# Patient Record
Sex: FEMALE | Race: BLACK OR AFRICAN AMERICAN | NOT HISPANIC OR LATINO | Employment: FULL TIME | URBAN - METROPOLITAN AREA
[De-identification: names, ages, dates, MRNs, and addresses within clinical notes are randomized per-mention and may not be internally consistent; named-entity substitution may affect disease eponyms.]

---

## 2017-12-02 ENCOUNTER — OFFICE VISIT (OUTPATIENT)
Dept: URGENT CARE | Facility: MEDICAL CENTER | Age: 37
End: 2017-12-02
Payer: MEDICAID

## 2017-12-02 PROCEDURE — 94640 AIRWAY INHALATION TREATMENT: CPT

## 2017-12-02 PROCEDURE — G0382 LEV 3 HOSP TYPE B ED VISIT: HCPCS

## 2017-12-05 NOTE — PROGRESS NOTES
Assessment    1  Asthma ([id number]) ([id number])    Plan  Asthma    · PredniSONE 10 MG Oral Tablet; take 3 tabelst for 3 days, 2 tablets for 3 days, 1tablet for 3 days   · Ventolin  (90 Base) MCG/ACT Inhalation Aerosol Solution; Inhale 2 puffsevery 4-6 hours as needed for wheeze  Wheezing    · Ipratropium-Albuterol 0 5-2 5 (3) MG/3ML Inhalation Solution  follow up with PCP for check and control meds  Chief Complaint    1  Wheezing  Chief Complaint Free Text Note Form: pt seen by PCP in [place] for same given inhaler but has not picked it up, wheezing SOB noted, denies asthma history  Coughing when lying down at night      History of Present Illness  HPI: 26-year-old female complains of cough and wheezing and shortness of breath for 1 week  She states she has had asthma in the past and has an inhaler but does not have it now  She saw her PCP but did not get the script filled  She had used her grandmother's nebulizer which help  She is short of breath when she lays down  No fever or chills  Hospital Based Practices Required Assessment:  Pain Assessment  the patient states they do not have pain  Social History   · Never a smoker    Current Meds   1  No Reported Medications Recorded    Allergies    1  No Known Drug Allergies    Vitals  Signs   Recorded: [id number] 01:25YB   Systolic: 361  Diastolic: 72  Recorded: [id number] 05:57PM   Temperature: 98 9 F, Rectal  Heart Rate: 104  Respiration: 24  Weight: 240 lb   O2 Saturation: 99  Pain Scale: 0    Physical Exam   Constitutional  General appearance: No acute distress, well appearing and well nourished  Eyes  Conjunctiva and lids: No swelling, erythema or discharge  Pupils and irises: Equal, round and reactive to light  Ears, Nose, Mouth, and Throat  External inspection of ears and nose: Normal    Otoscopic examination: Tympanic membranes translucent with normal light reflex  Canals patent without erythema     Nasal mucosa, septum, and turbinates: Normal without edema or erythema  Oropharynx: Normal with no erythema, edema, exudate or lesions  Pulmonary  Respiratory effort: No increased work of breathing or signs of respiratory distress  Auscultation of lungs: Abnormal  -- Diffuse wheezing  Cardiovascular  Auscultation of heart: Normal rate and rhythm, normal S1 and S2, without murmurs  Lymphatic  Palpation of lymph nodes in neck: No lymphadenopathy  Procedure   Treatment #1 included Albuterol 2 5 mg and Ipratropium 0 5 mg  After treatment #1, the patient noted symptomatic improvement  Air exchange was improved  Wheezes were heard diffusely  No rhonchi were appreciated  No rales were appreciated        Signatures   Electronically signed by : Kiko Leija, [facility]; Dec  2 2017  6:17PM EST                       (Author)    Electronically signed by : DENISE Benito ; Dec  4 2017  1:04PM EST                       (Co-author)

## 2018-01-23 VITALS
DIASTOLIC BLOOD PRESSURE: 72 MMHG | TEMPERATURE: 98.9 F | SYSTOLIC BLOOD PRESSURE: 130 MMHG | OXYGEN SATURATION: 99 % | HEART RATE: 104 BPM | WEIGHT: 240 LBS | RESPIRATION RATE: 24 BRPM

## 2018-12-17 ENCOUNTER — EMERGENCY (EMERGENCY)
Facility: HOSPITAL | Age: 38
LOS: 1 days | Discharge: HOME | End: 2018-12-17
Attending: EMERGENCY MEDICINE | Admitting: EMERGENCY MEDICINE
Payer: MEDICAID

## 2018-12-17 VITALS
RESPIRATION RATE: 18 BRPM | HEART RATE: 59 BPM | SYSTOLIC BLOOD PRESSURE: 114 MMHG | TEMPERATURE: 98 F | OXYGEN SATURATION: 100 % | DIASTOLIC BLOOD PRESSURE: 82 MMHG

## 2018-12-17 VITALS
RESPIRATION RATE: 16 BRPM | SYSTOLIC BLOOD PRESSURE: 120 MMHG | HEART RATE: 63 BPM | DIASTOLIC BLOOD PRESSURE: 71 MMHG | OXYGEN SATURATION: 100 % | TEMPERATURE: 98 F

## 2018-12-17 LAB
ALBUMIN SERPL ELPH-MCNC: 3.6 G/DL — SIGNIFICANT CHANGE UP (ref 3.3–5)
ALP SERPL-CCNC: 84 U/L — SIGNIFICANT CHANGE UP (ref 40–120)
ALT FLD-CCNC: 17 U/L — SIGNIFICANT CHANGE UP (ref 4–33)
APPEARANCE UR: CLEAR — SIGNIFICANT CHANGE UP
AST SERPL-CCNC: 19 U/L — SIGNIFICANT CHANGE UP (ref 4–32)
BASOPHILS # BLD AUTO: 0.08 K/UL — SIGNIFICANT CHANGE UP (ref 0–0.2)
BASOPHILS NFR BLD AUTO: 1.7 % — SIGNIFICANT CHANGE UP (ref 0–2)
BILIRUB SERPL-MCNC: 0.2 MG/DL — SIGNIFICANT CHANGE UP (ref 0.2–1.2)
BILIRUB UR-MCNC: NEGATIVE — SIGNIFICANT CHANGE UP
BLOOD UR QL VISUAL: SIGNIFICANT CHANGE UP
BUN SERPL-MCNC: 5 MG/DL — LOW (ref 7–23)
CALCIUM SERPL-MCNC: 8.9 MG/DL — SIGNIFICANT CHANGE UP (ref 8.4–10.5)
CHLORIDE SERPL-SCNC: 107 MMOL/L — SIGNIFICANT CHANGE UP (ref 98–107)
CO2 SERPL-SCNC: 23 MMOL/L — SIGNIFICANT CHANGE UP (ref 22–31)
COLOR SPEC: COLORLESS — SIGNIFICANT CHANGE UP
CREAT SERPL-MCNC: 0.83 MG/DL — SIGNIFICANT CHANGE UP (ref 0.5–1.3)
EOSINOPHIL # BLD AUTO: 0.31 K/UL — SIGNIFICANT CHANGE UP (ref 0–0.5)
EOSINOPHIL NFR BLD AUTO: 6.5 % — HIGH (ref 0–6)
GLUCOSE SERPL-MCNC: 94 MG/DL — SIGNIFICANT CHANGE UP (ref 70–99)
GLUCOSE UR-MCNC: NEGATIVE — SIGNIFICANT CHANGE UP
HCG SERPL-ACNC: < 5 MIU/ML — SIGNIFICANT CHANGE UP
HCT VFR BLD CALC: 36.2 % — SIGNIFICANT CHANGE UP (ref 34.5–45)
HGB BLD-MCNC: 11.7 G/DL — SIGNIFICANT CHANGE UP (ref 11.5–15.5)
HIV COMBO RESULT: SIGNIFICANT CHANGE UP
HIV1+2 AB SPEC QL: SIGNIFICANT CHANGE UP
IMM GRANULOCYTES # BLD AUTO: 0.01 # — SIGNIFICANT CHANGE UP
IMM GRANULOCYTES NFR BLD AUTO: 0.2 % — SIGNIFICANT CHANGE UP (ref 0–1.5)
KETONES UR-MCNC: NEGATIVE — SIGNIFICANT CHANGE UP
LEUKOCYTE ESTERASE UR-ACNC: NEGATIVE — SIGNIFICANT CHANGE UP
LYMPHOCYTES # BLD AUTO: 2.69 K/UL — SIGNIFICANT CHANGE UP (ref 1–3.3)
LYMPHOCYTES # BLD AUTO: 56.3 % — HIGH (ref 13–44)
MCHC RBC-ENTMCNC: 27.6 PG — SIGNIFICANT CHANGE UP (ref 27–34)
MCHC RBC-ENTMCNC: 32.3 % — SIGNIFICANT CHANGE UP (ref 32–36)
MCV RBC AUTO: 85.4 FL — SIGNIFICANT CHANGE UP (ref 80–100)
MONOCYTES # BLD AUTO: 0.45 K/UL — SIGNIFICANT CHANGE UP (ref 0–0.9)
MONOCYTES NFR BLD AUTO: 9.4 % — SIGNIFICANT CHANGE UP (ref 2–14)
NEUTROPHILS # BLD AUTO: 1.24 K/UL — LOW (ref 1.8–7.4)
NEUTROPHILS NFR BLD AUTO: 25.9 % — LOW (ref 43–77)
NITRITE UR-MCNC: NEGATIVE — SIGNIFICANT CHANGE UP
NRBC # FLD: 0 — SIGNIFICANT CHANGE UP
PH UR: 6.5 — SIGNIFICANT CHANGE UP (ref 5–8)
PLATELET # BLD AUTO: 259 K/UL — SIGNIFICANT CHANGE UP (ref 150–400)
PMV BLD: 10.3 FL — SIGNIFICANT CHANGE UP (ref 7–13)
POTASSIUM SERPL-MCNC: 4.5 MMOL/L — SIGNIFICANT CHANGE UP (ref 3.5–5.3)
POTASSIUM SERPL-SCNC: 4.5 MMOL/L — SIGNIFICANT CHANGE UP (ref 3.5–5.3)
PROT SERPL-MCNC: 7.4 G/DL — SIGNIFICANT CHANGE UP (ref 6–8.3)
PROT UR-MCNC: NEGATIVE — SIGNIFICANT CHANGE UP
RBC # BLD: 4.24 M/UL — SIGNIFICANT CHANGE UP (ref 3.8–5.2)
RBC # FLD: 15 % — HIGH (ref 10.3–14.5)
SODIUM SERPL-SCNC: 139 MMOL/L — SIGNIFICANT CHANGE UP (ref 135–145)
SP GR SPEC: 1.01 — SIGNIFICANT CHANGE UP (ref 1–1.04)
UROBILINOGEN FLD QL: NORMAL — SIGNIFICANT CHANGE UP
WBC # BLD: 4.78 K/UL — SIGNIFICANT CHANGE UP (ref 3.8–10.5)
WBC # FLD AUTO: 4.78 K/UL — SIGNIFICANT CHANGE UP (ref 3.8–10.5)

## 2018-12-17 PROCEDURE — 74176 CT ABD & PELVIS W/O CONTRAST: CPT | Mod: 26

## 2018-12-17 PROCEDURE — 99285 EMERGENCY DEPT VISIT HI MDM: CPT

## 2018-12-17 RX ORDER — MORPHINE SULFATE 50 MG/1
4 CAPSULE, EXTENDED RELEASE ORAL ONCE
Qty: 0 | Refills: 0 | Status: DISCONTINUED | OUTPATIENT
Start: 2018-12-17 | End: 2018-12-17

## 2018-12-17 RX ORDER — SODIUM CHLORIDE 9 MG/ML
1000 INJECTION INTRAMUSCULAR; INTRAVENOUS; SUBCUTANEOUS ONCE
Qty: 0 | Refills: 0 | Status: COMPLETED | OUTPATIENT
Start: 2018-12-17 | End: 2018-12-17

## 2018-12-17 RX ORDER — DIAZEPAM 5 MG
5 TABLET ORAL ONCE
Qty: 0 | Refills: 0 | Status: DISCONTINUED | OUTPATIENT
Start: 2018-12-17 | End: 2018-12-17

## 2018-12-17 RX ORDER — IBUPROFEN 200 MG
1 TABLET ORAL
Qty: 28 | Refills: 0 | OUTPATIENT
Start: 2018-12-17 | End: 2018-12-23

## 2018-12-17 RX ORDER — DIAZEPAM 5 MG
1 TABLET ORAL
Qty: 15 | Refills: 0 | OUTPATIENT
Start: 2018-12-17 | End: 2018-12-21

## 2018-12-17 RX ORDER — KETOROLAC TROMETHAMINE 30 MG/ML
30 SYRINGE (ML) INJECTION ONCE
Qty: 0 | Refills: 0 | Status: DISCONTINUED | OUTPATIENT
Start: 2018-12-17 | End: 2018-12-17

## 2018-12-17 RX ORDER — LIDOCAINE 4 G/100G
1 CREAM TOPICAL ONCE
Qty: 0 | Refills: 0 | Status: COMPLETED | OUTPATIENT
Start: 2018-12-17 | End: 2018-12-17

## 2018-12-17 RX ORDER — ONDANSETRON 8 MG/1
4 TABLET, FILM COATED ORAL ONCE
Qty: 0 | Refills: 0 | Status: COMPLETED | OUTPATIENT
Start: 2018-12-17 | End: 2018-12-17

## 2018-12-17 RX ADMIN — MORPHINE SULFATE 4 MILLIGRAM(S): 50 CAPSULE, EXTENDED RELEASE ORAL at 16:52

## 2018-12-17 RX ADMIN — Medication 5 MILLIGRAM(S): at 16:35

## 2018-12-17 RX ADMIN — MORPHINE SULFATE 4 MILLIGRAM(S): 50 CAPSULE, EXTENDED RELEASE ORAL at 12:53

## 2018-12-17 RX ADMIN — SODIUM CHLORIDE 2000 MILLILITER(S): 9 INJECTION INTRAMUSCULAR; INTRAVENOUS; SUBCUTANEOUS at 12:53

## 2018-12-17 RX ADMIN — ONDANSETRON 4 MILLIGRAM(S): 8 TABLET, FILM COATED ORAL at 12:53

## 2018-12-17 RX ADMIN — Medication 30 MILLIGRAM(S): at 16:36

## 2018-12-17 RX ADMIN — LIDOCAINE 1 PATCH: 4 CREAM TOPICAL at 16:41

## 2018-12-17 NOTE — ED ADULT TRIAGE NOTE - CHIEF COMPLAINT QUOTE
Pt. c/o intermittent, non-radiating lower back pain worse on right side x few weeks. States pain became worse last night with nausea and tingling in legs. Took Tylenol with no relief. Denies abdominal pain, n/v/d, dysuria or fevers. Pain worse when ambulating. Denies any injury.

## 2018-12-17 NOTE — ED ADULT NURSE REASSESSMENT NOTE - SYMPTOMS
continues to c.o. rt sided lower back pain currently 7/10, endorses some tingling in rt leg "sometimes" states she has been having this pain for a long time but recently it has gotten worse, denies recent injury, medicated as ordered.

## 2018-12-17 NOTE — ED PROVIDER NOTE - NS_ ATTENDINGSCRIBEDETAILS _ED_A_ED_FT
The scribe's documentation has been prepared under my direction and personally reviewed by me, Minerva Bob MD, in its entirety. I confirm that the note above accurately reflects all work, treatment, procedures, and medical decision making performed by me.

## 2018-12-17 NOTE — ED PROVIDER NOTE - OBJECTIVE STATEMENT
38y F with no significant PMHx presents with intermittent low back pain right side greater than left for several months. Pt states pain became acutely worse last night. Pt states pain is 9/10 in severity and feels pain internally. Specifically pain is not reproducible with touch. Pain is worse with movement or walking and radiates to abd. No radiation down leg. No weakness. No other complaints. Pt does endorse urinary urgency and nausea. No hematuria, dysuria, fever, or chills

## 2018-12-17 NOTE — ED PROVIDER NOTE - CONSTITUTIONAL, MLM
normal... Well appearing, well nourished, awake, alert, oriented to person, place, time/situation. +significant distress secondary to low back pain associated with movement during exam sitting up in bed Well appearing, well nourished, awake, alert, oriented to person, place, time/situation. +distress secondary to low back pain associated with movement during exam sitting up in bed

## 2018-12-17 NOTE — ED PROVIDER NOTE - PROGRESS NOTE DETAILS
Ct negative, but pain not well controlled so Valium and Lidociane and   Toradol given. Pt feels better at 530pm, ambulating with no steady gait but slow due to some pain, able to sit and get up from chair, eager to go home. DC with meds and rest, copies given.

## 2018-12-18 LAB — SPECIMEN SOURCE: SIGNIFICANT CHANGE UP

## 2018-12-19 LAB — BACTERIA UR CULT: SIGNIFICANT CHANGE UP

## 2019-06-18 ENCOUNTER — EMERGENCY (EMERGENCY)
Facility: HOSPITAL | Age: 39
LOS: 1 days | Discharge: HOME | End: 2019-06-18
Admitting: EMERGENCY MEDICINE
Payer: MEDICAID

## 2019-06-18 VITALS
SYSTOLIC BLOOD PRESSURE: 114 MMHG | HEART RATE: 60 BPM | DIASTOLIC BLOOD PRESSURE: 73 MMHG | TEMPERATURE: 98 F | OXYGEN SATURATION: 100 % | RESPIRATION RATE: 17 BRPM

## 2019-06-18 VITALS
SYSTOLIC BLOOD PRESSURE: 107 MMHG | RESPIRATION RATE: 18 BRPM | OXYGEN SATURATION: 100 % | HEART RATE: 60 BPM | DIASTOLIC BLOOD PRESSURE: 73 MMHG | TEMPERATURE: 99 F

## 2019-06-18 LAB
ALBUMIN SERPL ELPH-MCNC: 3.7 G/DL — SIGNIFICANT CHANGE UP (ref 3.3–5)
ALP SERPL-CCNC: 82 U/L — SIGNIFICANT CHANGE UP (ref 40–120)
ALT FLD-CCNC: 25 U/L — SIGNIFICANT CHANGE UP (ref 4–33)
ANION GAP SERPL CALC-SCNC: 10 MMO/L — SIGNIFICANT CHANGE UP (ref 7–14)
APPEARANCE UR: CLEAR — SIGNIFICANT CHANGE UP
AST SERPL-CCNC: 23 U/L — SIGNIFICANT CHANGE UP (ref 4–32)
BASOPHILS # BLD AUTO: 0.06 K/UL — SIGNIFICANT CHANGE UP (ref 0–0.2)
BASOPHILS NFR BLD AUTO: 0.9 % — SIGNIFICANT CHANGE UP (ref 0–2)
BILIRUB SERPL-MCNC: 0.2 MG/DL — SIGNIFICANT CHANGE UP (ref 0.2–1.2)
BILIRUB UR-MCNC: NEGATIVE — SIGNIFICANT CHANGE UP
BLOOD UR QL VISUAL: NEGATIVE — SIGNIFICANT CHANGE UP
BUN SERPL-MCNC: 10 MG/DL — SIGNIFICANT CHANGE UP (ref 7–23)
CALCIUM SERPL-MCNC: 9.1 MG/DL — SIGNIFICANT CHANGE UP (ref 8.4–10.5)
CHLORIDE SERPL-SCNC: 104 MMOL/L — SIGNIFICANT CHANGE UP (ref 98–107)
CO2 SERPL-SCNC: 25 MMOL/L — SIGNIFICANT CHANGE UP (ref 22–31)
COLOR SPEC: SIGNIFICANT CHANGE UP
CREAT SERPL-MCNC: 0.84 MG/DL — SIGNIFICANT CHANGE UP (ref 0.5–1.3)
EOSINOPHIL # BLD AUTO: 0.2 K/UL — SIGNIFICANT CHANGE UP (ref 0–0.5)
EOSINOPHIL NFR BLD AUTO: 2.9 % — SIGNIFICANT CHANGE UP (ref 0–6)
GLUCOSE SERPL-MCNC: 82 MG/DL — SIGNIFICANT CHANGE UP (ref 70–99)
GLUCOSE UR-MCNC: NEGATIVE — SIGNIFICANT CHANGE UP
HCG SERPL-ACNC: < 5 MIU/ML — SIGNIFICANT CHANGE UP
HCT VFR BLD CALC: 37.2 % — SIGNIFICANT CHANGE UP (ref 34.5–45)
HGB BLD-MCNC: 11.7 G/DL — SIGNIFICANT CHANGE UP (ref 11.5–15.5)
IMM GRANULOCYTES NFR BLD AUTO: 0.1 % — SIGNIFICANT CHANGE UP (ref 0–1.5)
KETONES UR-MCNC: NEGATIVE — SIGNIFICANT CHANGE UP
LEUKOCYTE ESTERASE UR-ACNC: NEGATIVE — SIGNIFICANT CHANGE UP
LYMPHOCYTES # BLD AUTO: 3.16 K/UL — SIGNIFICANT CHANGE UP (ref 1–3.3)
LYMPHOCYTES # BLD AUTO: 46.6 % — HIGH (ref 13–44)
MCHC RBC-ENTMCNC: 27.2 PG — SIGNIFICANT CHANGE UP (ref 27–34)
MCHC RBC-ENTMCNC: 31.5 % — LOW (ref 32–36)
MCV RBC AUTO: 86.5 FL — SIGNIFICANT CHANGE UP (ref 80–100)
MONOCYTES # BLD AUTO: 0.54 K/UL — SIGNIFICANT CHANGE UP (ref 0–0.9)
MONOCYTES NFR BLD AUTO: 8 % — SIGNIFICANT CHANGE UP (ref 2–14)
NEUTROPHILS # BLD AUTO: 2.81 K/UL — SIGNIFICANT CHANGE UP (ref 1.8–7.4)
NEUTROPHILS NFR BLD AUTO: 41.5 % — LOW (ref 43–77)
NITRITE UR-MCNC: NEGATIVE — SIGNIFICANT CHANGE UP
NRBC # FLD: 0 K/UL — SIGNIFICANT CHANGE UP (ref 0–0)
PH UR: 6.5 — SIGNIFICANT CHANGE UP (ref 5–8)
PLATELET # BLD AUTO: 279 K/UL — SIGNIFICANT CHANGE UP (ref 150–400)
PMV BLD: 11 FL — SIGNIFICANT CHANGE UP (ref 7–13)
POTASSIUM SERPL-MCNC: 4.2 MMOL/L — SIGNIFICANT CHANGE UP (ref 3.5–5.3)
POTASSIUM SERPL-SCNC: 4.2 MMOL/L — SIGNIFICANT CHANGE UP (ref 3.5–5.3)
PROT SERPL-MCNC: 7.4 G/DL — SIGNIFICANT CHANGE UP (ref 6–8.3)
PROT UR-MCNC: 10 — SIGNIFICANT CHANGE UP
RBC # BLD: 4.3 M/UL — SIGNIFICANT CHANGE UP (ref 3.8–5.2)
RBC # FLD: 14.6 % — HIGH (ref 10.3–14.5)
SODIUM SERPL-SCNC: 139 MMOL/L — SIGNIFICANT CHANGE UP (ref 135–145)
SP GR SPEC: 1.02 — SIGNIFICANT CHANGE UP (ref 1–1.04)
UROBILINOGEN FLD QL: NORMAL — SIGNIFICANT CHANGE UP
WBC # BLD: 6.78 K/UL — SIGNIFICANT CHANGE UP (ref 3.8–10.5)
WBC # FLD AUTO: 6.78 K/UL — SIGNIFICANT CHANGE UP (ref 3.8–10.5)

## 2019-06-18 PROCEDURE — 99285 EMERGENCY DEPT VISIT HI MDM: CPT

## 2019-06-18 PROCEDURE — 74177 CT ABD & PELVIS W/CONTRAST: CPT | Mod: 26

## 2019-06-18 PROCEDURE — 76830 TRANSVAGINAL US NON-OB: CPT | Mod: 26

## 2019-06-18 RX ORDER — SODIUM CHLORIDE 9 MG/ML
1000 INJECTION INTRAMUSCULAR; INTRAVENOUS; SUBCUTANEOUS ONCE
Refills: 0 | Status: COMPLETED | OUTPATIENT
Start: 2019-06-18 | End: 2019-06-18

## 2019-06-18 RX ORDER — MORPHINE SULFATE 50 MG/1
4 CAPSULE, EXTENDED RELEASE ORAL ONCE
Refills: 0 | Status: DISCONTINUED | OUTPATIENT
Start: 2019-06-18 | End: 2019-06-18

## 2019-06-18 RX ADMIN — SODIUM CHLORIDE 1000 MILLILITER(S): 9 INJECTION INTRAMUSCULAR; INTRAVENOUS; SUBCUTANEOUS at 15:08

## 2019-06-18 RX ADMIN — MORPHINE SULFATE 4 MILLIGRAM(S): 50 CAPSULE, EXTENDED RELEASE ORAL at 15:29

## 2019-06-18 NOTE — ED PROVIDER NOTE - CLINICAL SUMMARY MEDICAL DECISION MAKING FREE TEXT BOX
37 yo F with PMHX of diverticulitis, here with LLQ pain, pelvic pain and vag bleeding  Minimal blood on exam. NO CMT.   Plan for r/o diverticulitis vs uti vs ovarian pathology.   UCG, ua, labs, ct a/p, TVUS, pain control, reassess.

## 2019-06-18 NOTE — ED PROVIDER NOTE - PROGRESS NOTE DETAILS
labs wnl, ua no evidence of infection. UCG neg CT with diverticulosis, no diverticulitis. TVUS with no evidence of torsion or cyst. Pt feels improved. DC home with PCP fu labs wnl, ua no evidence of infection. UCG neg CT with diverticulosis, no diverticulitis. TVUS with no evidence of torsion or cyst. Pt feels improved, no pain, tolerating PO DC home with PCP fu

## 2019-06-18 NOTE — ED PROVIDER NOTE - OBJECTIVE STATEMENT
37 yo F with PMHx of diverticulitis presents to the ED c/o AUB and pelvic left sided discomfort x this am. Pt reports that she has experienced pain like this before, feels slightly different from when she was diagnosed with diverticulitis because it is lower in the pelvic area. Reports light pink discharge and vaginal pruritis. Denies sexual activity at this time. LMP was 7th to the 15th of this month (june). Denies nausea, vomiting, fevers, diarrhea, constipation.   LBM this am and normal. 39 yo F with PMHx of diverticulitis presents to the ED c/o AUB and pelvic left sided discomfort x this am. Pt reports that she has experienced pain like this before, feels slightly different from when she was diagnosed with diverticulitis because it is lower in the pelvic area. Reports light pink discharge and vaginal pruritis. Denies sexual activity at this time. LMP was 7th to the 15th of this month (june). Denies nausea, vomiting, fevers, diarrhea, constipation.   LBM this am and normal, non bloody, non dark. 37 yo F with PMHx of diverticulitis presents to the ED c/o AUB and pelvic left sided discomfort x this am. Pt reports that she has experienced pain like this before, feels slightly different from when she was diagnosed with diverticulitis because it is lower in the pelvic area. Reports light pink discharge and vaginal pruritis. Denies sexual activity at this time, has not been sexual active x years no h/o STDs.  LMP was 7th to the 15th of this month (june). Denies nausea, vomiting, fevers, diarrhea, constipation.   LBM this am and normal, non bloody, non dark.

## 2019-06-18 NOTE — ED ADULT NURSE NOTE - OBJECTIVE STATEMENT
break coverage RN- pt with PMH diverticulitis c/o LLQ pain and pink tinged vaginal discharge since yesterday. pt currently awake, a/ox3, vitally stable in NAD at this time, IV 20g placed to left AC, labs sent, fluids running as ordered, MD at bedside, awaiting further orders from MD, will continue to monitor, pt safety maintained.

## 2019-06-18 NOTE — ED PROVIDER NOTE - NSFOLLOWUPINSTRUCTIONS_ED_ALL_ED_FT
Follow up with your Primary Medical Doctor within 2-3days. If results or reports were given to you, show copies of your reports given to you. Take all of your medications as previously prescribed.   If you have issues obtaining follow up, please call: [phone] to obtain a doctor or specialist who takes your insurance in your area.    If Follow up with your Primary Medical Doctor within 2-3days. If results or reports were given to you, show copies of your reports given to you. Take all of your medications as previously prescribed.   If you have issues obtaining follow up, please call: [phone] to obtain a doctor or specialist who takes your insurance in your area.    If worsening pain, fevers, vomiting,

## 2019-06-20 LAB
BACTERIA UR CULT: SIGNIFICANT CHANGE UP
SPECIMEN SOURCE: SIGNIFICANT CHANGE UP

## 2020-12-23 ENCOUNTER — EMERGENCY (EMERGENCY)
Facility: HOSPITAL | Age: 40
LOS: 1 days | Discharge: HOME | End: 2020-12-23
Attending: EMERGENCY MEDICINE | Admitting: EMERGENCY MEDICINE
Payer: MEDICAID

## 2020-12-23 VITALS
TEMPERATURE: 98 F | OXYGEN SATURATION: 100 % | HEART RATE: 91 BPM | RESPIRATION RATE: 18 BRPM | SYSTOLIC BLOOD PRESSURE: 112 MMHG | DIASTOLIC BLOOD PRESSURE: 68 MMHG

## 2020-12-23 PROCEDURE — 99284 EMERGENCY DEPT VISIT MOD MDM: CPT

## 2020-12-23 RX ORDER — SODIUM CHLORIDE 9 MG/ML
1000 INJECTION INTRAMUSCULAR; INTRAVENOUS; SUBCUTANEOUS ONCE
Refills: 0 | Status: COMPLETED | OUTPATIENT
Start: 2020-12-23 | End: 2020-12-23

## 2020-12-23 NOTE — ED PROVIDER NOTE - PATIENT PORTAL LINK FT
You can access the FollowMyHealth Patient Portal offered by [facility] by registering at the following website: [email or web address]. By joining [employer]’s FollowMyHealth portal, you will also be able to view your health information using other applications (apps) compatible with our system.

## 2020-12-23 NOTE — ED PROVIDER NOTE - OBJECTIVE STATEMENT
39 y/o female with PMHx of asthma, GERD and gastric bypass surgery presents to ED c/o chills, myalgias, headache and nausea x 5 days. Pt states she vomited yesterday and today. Denies abdominal pain, chest pain, SOB, diarrhea, urinary symptoms, sick contacts or traveling. States that her son started to have a cough today. When asked, pt reported losing her sense of smell 5 days ago as well. No prior history of COVID19 infection.

## 2020-12-23 NOTE — ED PROVIDER NOTE - NS_ ATTENDINGSCRIBEDETAILS _ED_A_ED_FT
I,Samantha Bonilla, performed the initial face to face bedside interview with this patient regarding history of present illness, review of symptoms and relevant past medical, social and family history.  I completed an independent physical examination.  I was the initial provider who evaluated this patient. The history, relevant review of systems, past medical and surgical history, medical decision making, and physical examination was documented by the scribe in my presence and I attest to the accuracy of the documentation.

## 2020-12-23 NOTE — ED PROVIDER NOTE - CLINICAL SUMMARY MEDICAL DECISION MAKING FREE TEXT BOX
39 y/o female presenting with multiple complaints. Constellation of symptoms concerning for COVID19. Given history of vomiting, will check labs, get x-ray, send COVID19 swab and hydrate. 41 y/o female presenting with multiple complaints. Constellation of symptoms concerning for COVID19. Exam with no focal findings. Specifically, abdominal exam is benign. Given history of vomiting, will check labs to check for dehydration/ DONNY/ electrolyte abnormality, get x-ray, send COVID19 swab and hydrate.

## 2020-12-23 NOTE — ED PROVIDER NOTE - PROGRESS NOTE DETAILS
[place]: received pt on sign out. Pt well appearing. O2 sat 100% on RA. COVID +. CXR clear. Dimer elevated likely 2/2 COVID. Pt denies SOB. Will start on baby asa. Given strict return precautions. Pt verbalizes agreement and understanding. VSS.

## 2020-12-23 NOTE — ED ADULT TRIAGE NOTE - CHIEF COMPLAINT QUOTE
c/o chills, headache, eye pain, body pain, nausea, and whole body weakness since Saturday. vomited twice today. denies any sick contacts. denies chest pain, sob. hx asthma.

## 2020-12-23 NOTE — ED PROVIDER NOTE - NSFOLLOWUPINSTRUCTIONS_ED_ALL_ED_FT
You presented to the emergency department with fever or flu-like symptoms, you were found to have the COVID-19 infection. You were deemed healthy enough to go home at this time and do not require hospitalization. When you go home, please practice safe and sound hygiene by washing your hands frequently, covering your nose/mouth when coughing/sneezing and self quarantining yourself at home until your symptoms improve or go away. If possible, stay away from young children and the elderly as much as possible. Return immediately to the hospital if your symptoms worsen such as you are unable to breath, you start having fevers that are not improved with taking over the counter medications such as tylenol every 4 hours, you cannot eat or drink without throwing up, or you pass out. Otherwise, please follow up with your doctor in the next 1-2 days or when you get better so that they can make sure you are improving and that you do not need more work up for your condition.

## 2020-12-24 VITALS
OXYGEN SATURATION: 100 % | DIASTOLIC BLOOD PRESSURE: 78 MMHG | RESPIRATION RATE: 18 BRPM | HEART RATE: 91 BPM | SYSTOLIC BLOOD PRESSURE: 137 MMHG | TEMPERATURE: 98 F

## 2020-12-24 LAB
ALBUMIN SERPL ELPH-MCNC: 3.8 G/DL — SIGNIFICANT CHANGE UP (ref 3.3–5)
ALP SERPL-CCNC: 102 U/L — SIGNIFICANT CHANGE UP (ref 40–120)
ALT FLD-CCNC: 24 U/L — SIGNIFICANT CHANGE UP (ref 4–33)
ANION GAP SERPL CALC-SCNC: 13 MMOL/L — SIGNIFICANT CHANGE UP (ref 7–14)
APPEARANCE UR: CLEAR — SIGNIFICANT CHANGE UP
AST SERPL-CCNC: 35 U/L — HIGH (ref 4–32)
B PERT DNA SPEC QL NAA+PROBE: SIGNIFICANT CHANGE UP
BACTERIA # UR AUTO: NEGATIVE — SIGNIFICANT CHANGE UP
BASOPHILS # BLD AUTO: 0.04 K/UL — SIGNIFICANT CHANGE UP (ref 0–0.2)
BASOPHILS NFR BLD AUTO: 1.1 % — SIGNIFICANT CHANGE UP (ref 0–2)
BILIRUB SERPL-MCNC: <0.2 MG/DL — SIGNIFICANT CHANGE UP (ref 0.2–1.2)
BILIRUB UR-MCNC: NEGATIVE — SIGNIFICANT CHANGE UP
BUN SERPL-MCNC: 8 MG/DL — SIGNIFICANT CHANGE UP (ref 7–23)
C PNEUM DNA SPEC QL NAA+PROBE: SIGNIFICANT CHANGE UP
CALCIUM SERPL-MCNC: 9.1 MG/DL — SIGNIFICANT CHANGE UP (ref 8.4–10.5)
CHLORIDE SERPL-SCNC: 104 MMOL/L — SIGNIFICANT CHANGE UP (ref 98–107)
CO2 SERPL-SCNC: 22 MMOL/L — SIGNIFICANT CHANGE UP (ref 22–31)
COLOR SPEC: YELLOW — SIGNIFICANT CHANGE UP
CREAT SERPL-MCNC: 0.85 MG/DL — SIGNIFICANT CHANGE UP (ref 0.5–1.3)
CRP SERPL-MCNC: 7.2 MG/L — HIGH
D DIMER BLD IA.RAPID-MCNC: 847 NG/ML DDU — HIGH
DIFF PNL FLD: NEGATIVE — SIGNIFICANT CHANGE UP
EOSINOPHIL # BLD AUTO: 0.07 K/UL — SIGNIFICANT CHANGE UP (ref 0–0.5)
EOSINOPHIL NFR BLD AUTO: 1.9 % — SIGNIFICANT CHANGE UP (ref 0–6)
EPI CELLS # UR: SIGNIFICANT CHANGE UP /HPF (ref 0–5)
FERRITIN SERPL-MCNC: 503 NG/ML — HIGH (ref 15–150)
FLUAV H1 2009 PAND RNA SPEC QL NAA+PROBE: SIGNIFICANT CHANGE UP
FLUAV H1 RNA SPEC QL NAA+PROBE: SIGNIFICANT CHANGE UP
FLUAV H3 RNA SPEC QL NAA+PROBE: SIGNIFICANT CHANGE UP
FLUAV SUBTYP SPEC NAA+PROBE: SIGNIFICANT CHANGE UP
FLUBV RNA SPEC QL NAA+PROBE: SIGNIFICANT CHANGE UP
GLUCOSE SERPL-MCNC: 84 MG/DL — SIGNIFICANT CHANGE UP (ref 70–99)
GLUCOSE UR QL: NEGATIVE — SIGNIFICANT CHANGE UP
HADV DNA SPEC QL NAA+PROBE: SIGNIFICANT CHANGE UP
HCOV PNL SPEC NAA+PROBE: SIGNIFICANT CHANGE UP
HCT VFR BLD CALC: 36.7 % — SIGNIFICANT CHANGE UP (ref 34.5–45)
HGB BLD-MCNC: 12.4 G/DL — SIGNIFICANT CHANGE UP (ref 11.5–15.5)
HMPV RNA SPEC QL NAA+PROBE: SIGNIFICANT CHANGE UP
HPIV1 RNA SPEC QL NAA+PROBE: SIGNIFICANT CHANGE UP
HPIV2 RNA SPEC QL NAA+PROBE: SIGNIFICANT CHANGE UP
HPIV3 RNA SPEC QL NAA+PROBE: SIGNIFICANT CHANGE UP
HPIV4 RNA SPEC QL NAA+PROBE: SIGNIFICANT CHANGE UP
HYALINE CASTS # UR AUTO: SIGNIFICANT CHANGE UP /LPF (ref 0–7)
IANC: 1.38 K/UL — LOW (ref 1.5–8.5)
IMM GRANULOCYTES NFR BLD AUTO: 0.3 % — SIGNIFICANT CHANGE UP (ref 0–1.5)
KETONES UR-MCNC: ABNORMAL
LEUKOCYTE ESTERASE UR-ACNC: NEGATIVE — SIGNIFICANT CHANGE UP
LYMPHOCYTES # BLD AUTO: 1.87 K/UL — SIGNIFICANT CHANGE UP (ref 1–3.3)
LYMPHOCYTES # BLD AUTO: 50.7 % — HIGH (ref 13–44)
MCHC RBC-ENTMCNC: 28.8 PG — SIGNIFICANT CHANGE UP (ref 27–34)
MCHC RBC-ENTMCNC: 33.8 GM/DL — SIGNIFICANT CHANGE UP (ref 32–36)
MCV RBC AUTO: 85.2 FL — SIGNIFICANT CHANGE UP (ref 80–100)
MONOCYTES # BLD AUTO: 0.32 K/UL — SIGNIFICANT CHANGE UP (ref 0–0.9)
MONOCYTES NFR BLD AUTO: 8.7 % — SIGNIFICANT CHANGE UP (ref 2–14)
NEUTROPHILS # BLD AUTO: 1.38 K/UL — LOW (ref 1.8–7.4)
NEUTROPHILS NFR BLD AUTO: 37.3 % — LOW (ref 43–77)
NITRITE UR-MCNC: NEGATIVE — SIGNIFICANT CHANGE UP
NRBC # BLD: 0 /100 WBCS — SIGNIFICANT CHANGE UP
NRBC # FLD: 0 K/UL — SIGNIFICANT CHANGE UP
PH UR: 6 — SIGNIFICANT CHANGE UP (ref 5–8)
PLATELET # BLD AUTO: 230 K/UL — SIGNIFICANT CHANGE UP (ref 150–400)
POTASSIUM SERPL-MCNC: 4.1 MMOL/L — SIGNIFICANT CHANGE UP (ref 3.5–5.3)
POTASSIUM SERPL-SCNC: 4.1 MMOL/L — SIGNIFICANT CHANGE UP (ref 3.5–5.3)
PROCALCITONIN SERPL-MCNC: 0.06 NG/ML — SIGNIFICANT CHANGE UP (ref 0.02–0.1)
PROT SERPL-MCNC: 7.4 G/DL — SIGNIFICANT CHANGE UP (ref 6–8.3)
PROT UR-MCNC: ABNORMAL
RAPID RVP RESULT: DETECTED
RBC # BLD: 4.31 M/UL — SIGNIFICANT CHANGE UP (ref 3.8–5.2)
RBC # FLD: 15.6 % — HIGH (ref 10.3–14.5)
RBC CASTS # UR COMP ASSIST: <5 /HPF — SIGNIFICANT CHANGE UP (ref 0–4)
RV+EV RNA SPEC QL NAA+PROBE: SIGNIFICANT CHANGE UP
SARS-COV-2 RNA SPEC QL NAA+PROBE: DETECTED
SODIUM SERPL-SCNC: 139 MMOL/L — SIGNIFICANT CHANGE UP (ref 135–145)
SP GR SPEC: 1.02 — SIGNIFICANT CHANGE UP (ref 1.01–1.02)
UROBILINOGEN FLD QL: SIGNIFICANT CHANGE UP
WBC # BLD: 3.69 K/UL — LOW (ref 3.8–10.5)
WBC # FLD AUTO: 3.69 K/UL — LOW (ref 3.8–10.5)
WBC UR QL: <5 /HPF — SIGNIFICANT CHANGE UP (ref 0–5)

## 2020-12-24 PROCEDURE — 71045 X-RAY EXAM CHEST 1 VIEW: CPT | Mod: 26

## 2020-12-24 RX ORDER — ASPIRIN/CALCIUM CARB/MAGNESIUM 324 MG
1 TABLET ORAL
Qty: 14 | Refills: 0
Start: 2020-12-24 | End: 2021-01-06

## 2020-12-24 RX ADMIN — SODIUM CHLORIDE 1000 MILLILITER(S): 9 INJECTION INTRAMUSCULAR; INTRAVENOUS; SUBCUTANEOUS at 01:15

## 2020-12-24 NOTE — ED ADULT NURSE NOTE - OBJECTIVE STATEMENT
Pt received in intake room 3. Pt A&Ox4, complaining of generalized weakness, HA, chills, and vomiting. Pt states her symptoms started on Saturday and she vomited once yesterday and once this morning. Pt states her BURTON is 5/10 pain. Pt denies any chest pain, SOB, abd pain, dizziness, n/v/d at this time. Pt's respirations even and unlabored, no accessory muscle use. 20g placed in L AC, labs sent. Will continue to monitor.

## 2020-12-25 DIAGNOSIS — Z98.84 BARIATRIC SURGERY STATUS: Chronic | ICD-10-CM

## 2022-03-03 ENCOUNTER — EMERGENCY (EMERGENCY)
Facility: HOSPITAL | Age: 42
LOS: 1 days | Discharge: HOME | End: 2022-03-03
Attending: EMERGENCY MEDICINE | Admitting: EMERGENCY MEDICINE
Payer: MEDICAID

## 2022-03-03 VITALS
RESPIRATION RATE: 20 BRPM | SYSTOLIC BLOOD PRESSURE: 120 MMHG | OXYGEN SATURATION: 100 % | DIASTOLIC BLOOD PRESSURE: 87 MMHG | TEMPERATURE: 98 F | HEART RATE: 79 BPM

## 2022-03-03 VITALS
SYSTOLIC BLOOD PRESSURE: 128 MMHG | TEMPERATURE: 98 F | OXYGEN SATURATION: 100 % | HEART RATE: 84 BPM | DIASTOLIC BLOOD PRESSURE: 86 MMHG | RESPIRATION RATE: 18 BRPM

## 2022-03-03 DIAGNOSIS — Z98.84 BARIATRIC SURGERY STATUS: Chronic | ICD-10-CM

## 2022-03-03 PROBLEM — K21.9 GASTRO-ESOPHAGEAL REFLUX DISEASE WITHOUT ESOPHAGITIS: Chronic | Status: ACTIVE | Noted: 2020-12-25

## 2022-03-03 PROBLEM — J45.909 UNSPECIFIED ASTHMA, UNCOMPLICATED: Chronic | Status: ACTIVE | Noted: 2020-12-25

## 2022-03-03 PROCEDURE — 71045 X-RAY EXAM CHEST 1 VIEW: CPT | Mod: 26

## 2022-03-03 PROCEDURE — 99285 EMERGENCY DEPT VISIT HI MDM: CPT

## 2022-03-03 RX ORDER — ALBUTEROL 90 UG/1
1 AEROSOL, METERED ORAL ONCE
Refills: 0 | Status: COMPLETED | OUTPATIENT
Start: 2022-03-03 | End: 2022-03-03

## 2022-03-03 RX ORDER — IPRATROPIUM/ALBUTEROL SULFATE 18-103MCG
3 AEROSOL WITH ADAPTER (GRAM) INHALATION
Refills: 0 | Status: DISCONTINUED | OUTPATIENT
Start: 2022-03-03 | End: 2022-03-06

## 2022-03-03 RX ADMIN — Medication 3 MILLILITER(S): at 05:01

## 2022-03-03 RX ADMIN — ALBUTEROL 1 PUFF(S): 90 AEROSOL, METERED ORAL at 05:49

## 2022-03-03 RX ADMIN — Medication 3 MILLILITER(S): at 05:22

## 2022-03-03 RX ADMIN — Medication 50 MILLIGRAM(S): at 05:00

## 2022-03-03 NOTE — ED ADULT NURSE NOTE - OBJECTIVE STATEMENT
Pt received to room 21. A&Ox4. Ambulatory at baseline. Pmh of gastric bypass, asthma, gerd. Pt c/op wheezing with productive cough occurring for 1x week. Pt states she began a new job in the [employer] and endorses the air has caused her new onset of symptoms. Pt also endorses cough and wheezing is worse at night and when laying down. Pt states she used her prescribed inhaler 2x days ago with some relief, but symptoms have not resolved. Pt denies chest pain, SOB, fevers, chills, recent travel, near any sick individuals, near any known allergens. Medicated as per MD. Awaiting further orders.

## 2022-03-03 NOTE — ED PROVIDER NOTE - PROGRESS NOTE DETAILS
Reassessed pt, wheezing significantly decreased and feels much better after duonebs. Awaiting CXR to likely dc.   -Dr. Hurst

## 2022-03-03 NOTE — ED PROVIDER NOTE - CLINICAL SUMMARY MEDICAL DECISION MAKING FREE TEXT BOX
40yo F w/ pmh as above p/w wheezing, cough. H&P most consistent w/ asthma exacerbation, will get cxr to r/o pna (low suspicion but does have productive cough), meds, reassess.

## 2022-03-03 NOTE — ED PROVIDER NOTE - OBJECTIVE STATEMENT
Pertinent PMH/PSH/FHx/SHx and Review of Systems contained within:  42yo F w/ PSxh of gastric bypass; pmh inclusive of diverticulitis, gerd, asthma (well-controlled, never intubated), p/w wheezing x approx 1wk which began after starting new job in [employer] where temp has been very cold, worse at night, only mildly improved w/ albuterol inhaler which pt last used 2 days ago, a/w productive cough. Feels like prior asthma exacerbations thought last exacerbation was over a year ago. Pt is covid vaccinated x 2 last year. Denies smoking, denies chemical exposure, throat itching or swelling.    No fever/chills, No photophobia/eye pain/changes in vision, No ear pain/sore throat/dysphagia, No chest pain/palpitations, no stridor, No abdominal pain, No N/V/D, no dysuria/frequency/discharge, No neck/back pain, no rash, no new focal neuro symptoms.

## 2022-03-03 NOTE — ED ADULT NURSE NOTE - CHIEF COMPLAINT QUOTE
Pt st" I am wheezing ever since starting new job ....the house is really cold ... I am wheezing since Friday."  Hx Asthma.

## 2022-03-03 NOTE — ED ADULT TRIAGE NOTE - CHIEF COMPLAINT QUOTE
Pt st" I am wheezing ever since starting new job the house is really cold and I am wheezing since Friday."  Hx Asthma. Pt st" I am wheezing ever since starting new job ....the house is really cold ... I am wheezing since Friday."  Hx Asthma.

## 2022-03-03 NOTE — ED PROVIDER NOTE - NSFOLLOWUPINSTRUCTIONS_ED_ALL_ED_FT
You were seen in our emergency department for wheezing/shortness of breath.    Your symptoms are most consistent with an asthma attack, likely triggered by the cold weather you were exposed to in the [place].     Please take the prednisone as prescribed, and use the albuterol inhaler as discussed (2 puffs every 4 hours as needed for wheezing or shortness of breath).    Make sure to follow up with your primary doctor if your symptom haven't completely resolved in 4 days.     Return to the emergency department if you experience fever, chills, severe shortness of breath, severe pain, worsening symptoms, or any other symptoms that are concerning to you.

## 2022-03-03 NOTE — ED PROVIDER NOTE - PATIENT PORTAL LINK FT
You can access the FollowMyHealth Patient Portal offered by [facility] by registering at the following website: [email or web address]. By joining [employer]’s FollowMyHealth portal, you will also be able to view your health information using other applications (apps) compatible with our system.

## 2022-03-03 NOTE — ED PROVIDER NOTE - PHYSICAL EXAMINATION
Gen: Alert, mild resp distress, coughing  Head: NC, AT,  EOMI, normal lids/conjunctiva  ENT:  normal hearing, patent oropharynx without erythema/exudate  Neck: +supple, no tenderness/meningismus/JVD, +Trachea midline  Chest: no chest wall tenderness, equal chest rise  Pulm: Bilateral BS, mildly tachypneic, +diffuse exp wheezing  CV: RRR, no M/R/G, +dist pulses  Abd: +BS, soft, NT/ND  Rectal: deferred  Mskel: no edema/erythema/cyanosis  Skin: no rash  Neuro: AAOx3

## 2022-11-28 ENCOUNTER — APPOINTMENT (OUTPATIENT)
Dept: CT IMAGING | Facility: HOSPITAL | Age: 42
End: 2022-11-28

## 2022-11-28 ENCOUNTER — HOSPITAL ENCOUNTER (OUTPATIENT)
Facility: HOSPITAL | Age: 42
Setting detail: OBSERVATION
Discharge: HOME | End: 2022-11-29
Attending: EMERGENCY MEDICINE | Admitting: INTERNAL MEDICINE

## 2022-11-28 DIAGNOSIS — M54.2 NECK PAIN: ICD-10-CM

## 2022-11-28 DIAGNOSIS — R42 DIZZY: ICD-10-CM

## 2022-11-28 DIAGNOSIS — R29.90 STROKE-LIKE SYMPTOMS: Primary | ICD-10-CM

## 2022-11-28 PROBLEM — R51.9 HEADACHE: Status: ACTIVE | Noted: 2022-11-28

## 2022-11-28 PROBLEM — K21.9 GERD (GASTROESOPHAGEAL REFLUX DISEASE): Status: ACTIVE | Noted: 2022-11-28

## 2022-11-28 PROBLEM — J45.909 ASTHMA: Status: ACTIVE | Noted: 2022-11-28

## 2022-11-28 LAB
ANION GAP SERPL CALCULATED.3IONS-SCNC: 7 MMOL/L (ref 4–13)
APTT PPP: 35 SECONDS (ref 23–37)
BUN SERPL-MCNC: 10 MG/DL (ref 5–25)
CALCIUM SERPL-MCNC: 7.7 MG/DL (ref 8.3–10.1)
CARDIAC TROPONIN I PNL SERPL HS: <2 NG/L
CHLORIDE SERPL-SCNC: 103 MMOL/L (ref 96–108)
CO2 SERPL-SCNC: 25 MMOL/L (ref 21–32)
CREAT SERPL-MCNC: 0.83 MG/DL (ref 0.6–1.3)
ERYTHROCYTE [DISTWIDTH] IN BLOOD BY AUTOMATED COUNT: 13 % (ref 11.6–15.1)
FLUAV RNA RESP QL NAA+PROBE: NEGATIVE
FLUBV RNA RESP QL NAA+PROBE: NEGATIVE
GFR SERPL CREATININE-BSD FRML MDRD: 86 ML/MIN/1.73SQ M
GLUCOSE SERPL-MCNC: 96 MG/DL (ref 65–140)
GLUCOSE SERPL-MCNC: 96 MG/DL (ref 65–140)
HCT VFR BLD AUTO: 35.4 % (ref 34.8–46.1)
HGB BLD-MCNC: 11.5 G/DL (ref 11.5–15.4)
INR PPP: 1.06 (ref 0.84–1.19)
MCH RBC QN AUTO: 29.7 PG (ref 26.8–34.3)
MCHC RBC AUTO-ENTMCNC: 32.5 G/DL (ref 31.4–37.4)
MCV RBC AUTO: 92 FL (ref 82–98)
PLATELET # BLD AUTO: 236 THOUSANDS/UL (ref 149–390)
PMV BLD AUTO: 10.5 FL (ref 8.9–12.7)
POTASSIUM SERPL-SCNC: 3.9 MMOL/L (ref 3.5–5.3)
PROTHROMBIN TIME: 13.6 SECONDS (ref 11.6–14.5)
RBC # BLD AUTO: 3.87 MILLION/UL (ref 3.81–5.12)
RSV RNA RESP QL NAA+PROBE: NEGATIVE
SARS-COV-2 RNA RESP QL NAA+PROBE: NEGATIVE
SODIUM SERPL-SCNC: 135 MMOL/L (ref 135–147)
WBC # BLD AUTO: 6.05 THOUSAND/UL (ref 4.31–10.16)

## 2022-11-28 RX ORDER — ONDANSETRON 2 MG/ML
4 INJECTION INTRAMUSCULAR; INTRAVENOUS EVERY 8 HOURS PRN
Status: DISCONTINUED | OUTPATIENT
Start: 2022-11-28 | End: 2022-11-29 | Stop reason: HOSPADM

## 2022-11-28 RX ORDER — MAGNESIUM SULFATE 1 G/100ML
1 INJECTION INTRAVENOUS ONCE
Status: COMPLETED | OUTPATIENT
Start: 2022-11-28 | End: 2022-11-28

## 2022-11-28 RX ORDER — ATORVASTATIN CALCIUM 40 MG/1
40 TABLET, FILM COATED ORAL
Status: DISCONTINUED | OUTPATIENT
Start: 2022-11-28 | End: 2022-11-29

## 2022-11-28 RX ORDER — MECLIZINE HCL 12.5 MG/1
12.5 TABLET ORAL EVERY 8 HOURS PRN
Status: DISCONTINUED | OUTPATIENT
Start: 2022-11-28 | End: 2022-11-29 | Stop reason: HOSPADM

## 2022-11-28 RX ORDER — FENTANYL CITRATE 50 UG/ML
50 INJECTION, SOLUTION INTRAMUSCULAR; INTRAVENOUS ONCE
Status: COMPLETED | OUTPATIENT
Start: 2022-11-28 | End: 2022-11-28

## 2022-11-28 RX ORDER — CYCLOBENZAPRINE HCL 10 MG
10 TABLET ORAL 3 TIMES DAILY PRN
Status: DISCONTINUED | OUTPATIENT
Start: 2022-11-28 | End: 2022-11-29 | Stop reason: HOSPADM

## 2022-11-28 RX ORDER — ENOXAPARIN SODIUM 100 MG/ML
40 INJECTION SUBCUTANEOUS DAILY
Status: DISCONTINUED | OUTPATIENT
Start: 2022-11-29 | End: 2022-11-29 | Stop reason: HOSPADM

## 2022-11-28 RX ORDER — DIPHENHYDRAMINE HYDROCHLORIDE 50 MG/ML
25 INJECTION INTRAMUSCULAR; INTRAVENOUS ONCE
Status: COMPLETED | OUTPATIENT
Start: 2022-11-28 | End: 2022-11-28

## 2022-11-28 RX ORDER — KETOROLAC TROMETHAMINE 30 MG/ML
15 INJECTION, SOLUTION INTRAMUSCULAR; INTRAVENOUS ONCE
Status: COMPLETED | OUTPATIENT
Start: 2022-11-28 | End: 2022-11-28

## 2022-11-28 RX ORDER — CLOPIDOGREL BISULFATE 75 MG/1
300 TABLET ORAL ONCE
Status: COMPLETED | OUTPATIENT
Start: 2022-11-28 | End: 2022-11-28

## 2022-11-28 RX ORDER — ASPIRIN 325 MG
325 TABLET ORAL ONCE
Status: COMPLETED | OUTPATIENT
Start: 2022-11-28 | End: 2022-11-28

## 2022-11-28 RX ORDER — METOCLOPRAMIDE HYDROCHLORIDE 5 MG/ML
10 INJECTION INTRAMUSCULAR; INTRAVENOUS ONCE
Status: COMPLETED | OUTPATIENT
Start: 2022-11-28 | End: 2022-11-28

## 2022-11-28 RX ORDER — CYCLOBENZAPRINE HCL 10 MG
10 TABLET ORAL ONCE
Status: COMPLETED | OUTPATIENT
Start: 2022-11-28 | End: 2022-11-28

## 2022-11-28 RX ADMIN — MECLIZINE 12.5 MG: 12.5 TABLET ORAL at 15:33

## 2022-11-28 RX ADMIN — FENTANYL CITRATE 50 MCG: 50 INJECTION INTRAMUSCULAR; INTRAVENOUS at 14:39

## 2022-11-28 RX ADMIN — CYCLOBENZAPRINE HYDROCHLORIDE 10 MG: 10 TABLET, FILM COATED ORAL at 17:46

## 2022-11-28 RX ADMIN — ATORVASTATIN CALCIUM 40 MG: 40 TABLET, FILM COATED ORAL at 15:33

## 2022-11-28 RX ADMIN — MAGNESIUM SULFATE HEPTAHYDRATE 1 G: 1 INJECTION, SOLUTION INTRAVENOUS at 15:33

## 2022-11-28 RX ADMIN — METOCLOPRAMIDE 10 MG: 5 INJECTION, SOLUTION INTRAMUSCULAR; INTRAVENOUS at 14:39

## 2022-11-28 RX ADMIN — SODIUM CHLORIDE 1000 ML: 0.9 INJECTION, SOLUTION INTRAVENOUS at 14:42

## 2022-11-28 RX ADMIN — ONDANSETRON 4 MG: 2 INJECTION INTRAMUSCULAR; INTRAVENOUS at 15:33

## 2022-11-28 RX ADMIN — IOHEXOL 85 ML: 350 INJECTION, SOLUTION INTRAVENOUS at 14:16

## 2022-11-28 RX ADMIN — DIPHENHYDRAMINE HYDROCHLORIDE 25 MG: 50 INJECTION, SOLUTION INTRAMUSCULAR; INTRAVENOUS at 14:38

## 2022-11-28 RX ADMIN — KETOROLAC TROMETHAMINE 15 MG: 30 INJECTION, SOLUTION INTRAMUSCULAR at 14:38

## 2022-11-28 RX ADMIN — ASPIRIN 325 MG ORAL TABLET 325 MG: 325 PILL ORAL at 15:01

## 2022-11-28 RX ADMIN — CLOPIDOGREL BISULFATE 300 MG: 75 TABLET ORAL at 15:01

## 2022-11-28 NOTE — ASSESSMENT & PLAN NOTE
History of migraine headaches  Currently have posterior neck pain  No photosensitivity/ sound sensitivity  Migraine cocktail ordered by neurology  P r n  Tylenol

## 2022-11-28 NOTE — CONSULTS
Consultation - Stroke   Michoacano Yao 36 y o  female MRN: [id number]  Unit/Bed#: ED 06 Encounter: [id number]      Assessment/Plan     Stroke-like symptoms  Assessment & Plan   Michoacano Yao is a 36 y o   female who with hld, gerd, and asthma who presented to the ED 11/28/2022 with stroke like symptoms  Stroke alert initiated  NIH 2  /83  CTH/CTA without acute findings  Deemed not a candidate for TNK or thrombectomy  Loaded with asa 325mg and plavix 300mg x1 and admitted on stroke pathway for further work-up  The patient reports that she has intermittent left neck pain for the past 3 months which has become constant in the past 2 weeks  She also reports URI with nasal congestion for the past week  She went to the dentist this morning for a routine cleaning and between 11-12 she developed a sudden severe headache with associated nausea for which she took 2 tylenol  Her headache is currently resolved; however nausea persists  Following her headache, she experienced horizontal diplopia that is also resolved at present  She also felt dizzy and off balance like the room was spinning  She initially went to urgent care and they referred her to the ED for further evaluation  Imaging:  CTH: IMPRESSION:  No acute intracranial abnormality  CTA: IMPRESSION:  No significant stenosis of the cervical carotid or vertebral bodies  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  No significant intracranial stenosis, large vessel occlusion or aneurysm  Recommendations:  Stroke vs complex migraine vs vertigo  - Stroke pathway  • MRI brain  • Echo  • Lipid Panel  • Hemoglobin A1c  • TSH  • Aspirin 325mg x1 followed by 81 mg daily starting 11/29  • Plavix 300mg x1 followed by 75mg daily starting 11/29  • Atorvastatin 40 mg daily  • Permissive HTN, labetalol if SBP >200  • Euglycemic, normothermic goal  • Continue telemetry  • PT/OT/ST  • Stroke education  • Frequent neuro checks   Continue to monitor and notify neurology with any changes  • STAT CT head for any acute change in neuro exam  - Medical management and supportive care per primary team  Correction of any metabolic or infectious disturbances  Dizzy  Assessment & Plan  Ensure adequate hydration  Meclizine 25mg x1  MRI pending    Headache  Assessment & Plan  Remote hx of migraines  Headache currently resolved; reports left posterior neck pain  No photo/phono sensitivity; +nausea  Recommend migraine cocktail:  -1L IVF bolus  -benadryl 25mg x1  -reglan 10mg x1  -toradol 15mg x1  -mag sulfate 1gm x1  -prn tylenol  -prn aqua K pad to neck  -prn zofran    Thrombolytic Decision: Patient not a candidate  Symptoms resolved/clearly non disabling  Please refer to attending attestation for additional recommendations    Recommendations for outpatient neurological follow up have yet to be determined  Reason for Consult / Principal Problem: stroke alert  Hx and PE limited by: na  Patient last known well: approximately 11am  Stroke alert called: 6310  Neurology time of arrival: 1411  HPI: Mariely Guzman is a 36 y o   female who with hld, gerd, remote history of migraines and asthma who presented to the ED 11/28/2022 with stroke like symptoms  Stroke alert initiated  NIH 2  /83  CTH/CTA without acute findings  Deemed not a candidate for TNK or thrombectomy  Loaded with asa 325mg and plavix 300mg x1 and admitted on stroke pathway for further work-up  The patient reports that she has intermittent left neck pain for the past 3 months which has become constant in the past 2 weeks  She also reports URI with nasal congestion for the past week  She went to the dentist this morning for a routine cleaning and between 11-12 she developed a sudden severe headache with associated nausea for which she took 2 tylenol  Her headache is currently resolved; however nausea persists  Following her headache, she experienced horizontal diplopia that is also resolved at present   She also felt off balance and dizzy like the room was spinning  She initially went to urgent care and they referred her to the ED for further evaluation  Inpatient consult to Neurology  Consult performed by: JONAH Simmons  Consult ordered by: Elayne Sterling DO          Review of Systems   Gastrointestinal: Positive for nausea  Neurological: Positive for dizziness  Psychiatric/Behavioral: Positive for sleep disturbance  See HPI    Historical Information   No past medical history on file  No past surgical history on file  Social History   Social History     Substance and Sexual Activity   Alcohol Use Not on file     Social History     Substance and Sexual Activity   Drug Use Not on file     No existing history information found  No existing history information found  Social History     Tobacco Use   Smoking Status Not on file   Smokeless Tobacco Not on file     Family History: No family history on file  Review of previous medical records was completed  Meds/Allergies   current meds:   Current Facility-Administered Medications   Medication Dose Route Frequency   • atorvastatin (LIPITOR) tablet 40 mg  40 mg Oral Daily With Dinner   • sodium chloride 0 9 % bolus 1,000 mL  1,000 mL Intravenous Once    and PTA meds:   None       Not on File    Objective   Vitals:Blood pressure 127/75, pulse 72, temperature 98 °F (36 7 °C), temperature source Tympanic, resp  rate 20, height 5' 4" (1 626 m), weight 84 5 kg (186 lb 3 2 oz), SpO2 100 %  ,Body mass index is 31 96 kg/m²  No intake or output data in the 24 hours ending 11/28/22 1521    Invasive Devices: Invasive Devices     Peripheral Intravenous Line  Duration           Peripheral IV 11/28/22 Right Antecubital <1 day                Physical Exam  Vitals reviewed  HENT:      Head: Normocephalic and atraumatic  Pulmonary:      Effort: Pulmonary effort is normal    Skin:     General: Skin is warm and dry     Neurological:      Mental Status: She is alert and oriented to person, place, and time  Cranial Nerves: Cranial nerves 2-12 are intact  Coordination: Finger-Nose-Finger Test and Heel to [place] lazara Test normal       Deep Tendon Reflexes:      Reflex Scores:       Tricep reflexes are 2+ on the right side and 2+ on the left side  Bicep reflexes are 2+ on the right side and 2+ on the left side  Brachioradialis reflexes are 2+ on the right side and 2+ on the left side  Patellar reflexes are 2+ on the right side and 2+ on the left side  Psychiatric:         Speech: Speech normal        Neurologic Exam     Mental Status   Oriented to person, place, and time  Attention: normal  Concentration: normal    Speech: speech is normal   Level of consciousness: alert  Able to name object  Able to read  Able to repeat  Normal comprehension  Cranial Nerves   Cranial nerves II through XII intact  Motor Exam     Strength   Strength 5/5 except as noted  Right deltoid 4+/5     Sensory Exam   Light touch normal    Vibration normal    Pinprick normal    Decreased temperature on RUE/RLE     Gait, Coordination, and Reflexes     Coordination   Finger to nose coordination: normal  Heel to shin coordination: normal    Tremor   Resting tremor: absent    Reflexes   Right brachioradialis: 2+  Left brachioradialis: 2+  Right biceps: 2+  Left biceps: 2+  Right triceps: 2+  Left triceps: 2+  Right patellar: 2+  Left patellar: 2+  Right plantar: normal  Left plantar: normal      NIHSS:  1a Level of Consciousness: 0 = Alert   1b  LOC Questions: 0 = Answers both correctly   1c  LOC Commands: 0 = Obeys both correctly   2  Best Gaze: 0 = Normal   3  Visual: 0 = No visual field loss   4  Facial Palsy: 0=Normal symmetric movement   5a  Motor Right Arm: 1=Drift, limb holds 90 (or 45) degrees but drifts down before full 10 seconds: does not hit bed   5b  Motor Left Arm: 0=No drift, limb holds 90 (or 45) degrees for full 10 seconds   6a   Motor Right Le=No drift, limb holds 90 (or 45) degrees for full 10 seconds   6b  Motor Left Le=No drift, limb holds 90 (or 45) degrees for full 10 seconds   7  Limb Ataxia:  0=Absent   8  Sensory: 1=Mild to moderate sensory loss; patient feels pinprick is less sharp or is dull on the affected side; there is a loss of superficial pain with pinprick but patient is aware She is being touched   9  Best Language:  0=No aphasia, normal   10  Dysarthria: 0=Normal articulation   11  Extinction and Inattention (formerly Neglect): 0=No abnormality   Total Score: 2     Time NIHSS was completed: 1425    Modified [place] Score:  0 (No baseline symptoms/disability)    Lab Results:   CBC:   Results from last 7 days   Lab Units 22  1425   WBC Thousand/uL 6 05   RBC Million/uL 3 87   HEMOGLOBIN g/dL 11 5   HEMATOCRIT % 35 4   MCV fL 92   PLATELETS Thousands/uL 236   , BMP/CMP:   Results from last 7 days   Lab Units 22  1425   SODIUM mmol/L 135   POTASSIUM mmol/L 3 9   CHLORIDE mmol/L 103   CO2 mmol/L 25   BUN mg/dL 10   CREATININE mg/dL 0 83   CALCIUM mg/dL 7 7*   EGFR ml/min/1 73sq m 86   , Vitamin B12:   , HgBA1C:   , TSH:   , Coagulation:   , Lipid Profile:   , Ammonia:   , Urinalysis:       Invalid input(s): URIBILINOGEN, Drug Screen:   , Medication Drug Levels:       Invalid input(s): CARBAMAZEPINE,  PHENOBARB, LACOSAMIDE, OXCARBAZEPINE     Imaging Studies: I have personally reviewed pertinent reports  and I have personally reviewed pertinent films in PACS     EKG, Pathology, and Other Studies: I have personally reviewed pertinent reports  Code Status: No Order    Counseling / Coordination of Care  Total Critical Care time spent 32 minutes excluding procedures, teaching and family updates  Assessment, images, and plan reviewed with Dr Cheyanne Dumont   Plan discussed with patient, her family at bedside and ED attending

## 2022-11-28 NOTE — ED PROVIDER NOTES
History  Chief Complaint   Patient presents with   • Back Pain   • Neck Pain   • Headache     Pt had pain in upper back radiating into neck this am accompanied by double vision and difficulty walking     HPI    None       No past medical history on file  No past surgical history on file  No family history on file  I have reviewed and agree with the history as documented  No existing history information found  No existing history information found         Review of Systems    Physical Exam  Physical Exam    Vital Signs  ED Triage Vitals [11/28/22 1400]   Temperature Pulse Respirations Blood Pressure SpO2   98 °F (36 7 °C) 88 18 123/83 99 %      Temp Source Heart Rate Source Patient Position - Orthostatic VS BP Location FiO2 (%)   Tympanic Monitor Sitting Right arm --      Pain Score       10 - Worst Possible Pain           Vitals:    11/28/22 1400   BP: 123/83   Pulse: 88   Patient Position - Orthostatic VS: Sitting         Visual Acuity  Visual Acuity    Flowsheet Row Most Recent Value   L Pupil Size (mm) 4   R Pupil Size (mm) 4   L Pupil Shape Round   R Pupil Shape Round          ED Medications  Medications   fentanyl citrate (PF) 100 MCG/2ML 50 mcg (has no administration in time range)   iohexol (OMNIPAQUE) 350 MG/ML injection (SINGLE-DOSE) 85 mL (85 mL Intravenous Given 11/28/22 1416)       Diagnostic Studies  Results Reviewed     Procedure Component Value Units Date/Time    Basic metabolic panel [[id number]]     Lab Status: No result Specimen: Blood     CBC and Platelet [[id number]]     Lab Status: No result Specimen: Blood     Protime-INR [[id number]]     Lab Status: No result Specimen: Blood     APTT [[id number]]     Lab Status: No result Specimen: Blood     HS Troponin 0hr (reflex protocol) [[id number]]     Lab Status: No result Specimen: Blood     FLU/RSV/COVID - if FLU/RSV clinically relevant [[id number]]     Lab Status: No result Specimen: Nares from Nose                  CT stroke alert brain (Results Pending)   CTA stroke alert (head/neck)    (Results Pending)              Procedures  Procedures         ED Course  ED Course as of 11/28/22 1500   Mon Nov 28, 2022   1431 Stroke alert called on patient evaluation  Patient is accompanied to CT scan  No abnormalities noted in CT scan by Neurology  Awaiting official reads   Patient seen by neurology  To be admitted to the stroke pathway  Will discuss w SLIM                                             MDM    Disposition  Final diagnoses:   Stroke-like symptoms     Time reflects when diagnosis was documented in both MDM as applicable and the Disposition within this note     Time User Action Codes Description Comment    11/28/2022  2:05 PM Candace Alas Add [R29 90] Stroke-like symptoms       ED Disposition     None      Follow-up Information    None         Patient's Medications    No medications on file       No discharge procedures on file      PDMP Review     None          ED Provider  Electronically Signed by

## 2022-11-28 NOTE — H&P
[facility] 1980, 36 y o  female MRN: [id number]  Unit/Bed#: ED 06 Encounter: [id number]  Primary Care Provider: No primary care provider on file  Date and time admitted to hospital: 11/28/2022  2:13 PM    * Stroke-like symptoms  Assessment & Plan  36 year female with GERD and asthma as per who presented to ER following refer to the urgent care due to dizziness, nausea and vomiting  She states to have developed headache with associated nausea  NIH 2  CTH/CTA with no acute findings  Not deemed a TNK or thrombectomy candidate  Concern for complex migraine vs TIA  Status post aspirin 325 mg and Plavix 300 mg x 1  Neurology on board  · Follow MRI brain  · Obtain 2D echo,  · Permissive hypertension  Consider labetalol if SBP greater than 200  · Status post aspirin 325 mg x 1 in the ER,  Start aspirin 81 mg from on 11/29  · Status post Plavix 300 mg x 1,  Plavix 75 mg daily starting on 11/29  · Check HbA1c and lipid panel  · Atorvastatin 40 mg daily  · Continue to monitor on telemetry  · Physical therapy/occupational therapy/speech therapy  · Frequent neuro checks  · Avoid hypoglycemia/hyperglycemia/pyrexia    Headache  Assessment & Plan  History of migraine headaches  Currently she dean have tenderness in the area of the left trapezius muscle area  No photosensitivity/ sound sensitivity  Migraine cocktail ordered by neurology  PRN Flexeril 10 mg tid prn  P r n  Tylenol  Dizzy  Assessment & Plan  Reports dizziness at the time of headache  No longer symptomatic at this time  Follow MRI brain  Continue meclizine 12 5 mg q 8 p r n  GERD (gastroesophageal reflux disease)  Assessment & Plan  Endorses history of GERD  Previously on PPI however not on any PPI at this time  Asthma  Assessment & Plan  Stable  In no acute exacerbation at this time      VTE Prophylaxis: Enoxaparin (Lovenox)  / sequential compression device   Code Status: Full code  POLST: There is no POLST form on file for this patient (pre-hospital)  Discussion with family:  Sister/ niece at bedside    Anticipated Length of Stay:  Patient will be admitted on an Observation basis with an anticipated length of stay of  < 2 midnights  Justification for Hospital Stay:  Stroke rule out    Total Time for Visit, including Counseling / Coordination of Care: 45 minutes  Greater than 50% of this total time spent on direct patient counseling and coordination of care  Chief Complaint:   Headache     History of Present Illness:    Norberto Sacks is a 36 y o  female with past medical history significant for as per who presents with severe headache which started this morning  Reported associated double vision and difficulty walking and gait instability  She initially presented to an urgent care with further referred to the ER due to concern for stroke  Reports associated nausea, and diplopia  She also endorses left-sided neck pain, has been progressive for the last 1 week  Denies any obvious source of trauma however 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 - which could be a potential source of trauma  She denies fever, chills, vomiting, falls, incontinence or loss of consciousness  Family history of stroke in her aunt  No history of diabetes, hypertension, tobacco use or illicit drug use  In the ER, noted to be hemodynamically stable, NIH score of 2,   CTA/CT head showed no acute intracranial abnormality/LVO  CBC and CMP are unremarkable  She has received aspirin 325 mg and Plavix 300 mg in the ER  There was concern for migraine and she received a migraine cocktail of IVF and Benadryl, Reglan and Toradol  Will admit to internal medicine for stroke rule out  Neurology consulted  Follow-up with MRI brain  Review of Systems:    Review of Systems   Constitutional: Negative for chills and fever  HENT: Negative for ear pain and sore throat      Eyes: Negative for pain and visual disturbance  Respiratory: Negative for cough and shortness of breath  Cardiovascular: Negative for chest pain and palpitations  Gastrointestinal: Negative for abdominal pain and vomiting  Genitourinary: Negative for dysuria and hematuria  Musculoskeletal: Negative for arthralgias and back pain  Skin: Negative for color change and rash  Neurological: Positive for dizziness and headaches  Negative for seizures, syncope and speech difficulty  All other systems reviewed and are negative  Past Medical and Surgical History:     No past medical history on file  No past surgical history on file  Meds/Allergies:    Prior to Admission medications    Not on File     I have reviewed home medications with patient personally  Allergies: Not on File    Social History:     Marital Status: Single   Occupation:  Home health  Patient Pre-hospital Living Situation:  Lives with family  Patient Pre-hospital Level of Mobility:  Self ambulatory  Patient Pre-hospital Diet Restrictions:  None  Substance Use History:   Social History     Substance and Sexual Activity   Alcohol Use Not on file     Social History     Tobacco Use   Smoking Status Not on file   Smokeless Tobacco Not on file     Social History     Substance and Sexual Activity   Drug Use Not on file       Family History:    non-contributory    Physical Exam:     Vitals:   Blood Pressure: 132/76 (11/28/22 1700)  Pulse: 67 (11/28/22 1700)  Temperature: 98 °F (36 7 °C) (11/28/22 1400)  Temp Source: Tympanic (11/28/22 1400)  Respirations: 19 (11/28/22 1700)  Height: 5' 4" (162 6 cm) (11/28/22 1404)  Weight - Scale: 84 5 kg (186 lb 3 2 oz) (11/28/22 1404)  SpO2: 100 % (11/28/22 1700)    Physical Exam  Vitals and nursing note reviewed  Constitutional:       General: She is not in acute distress  Appearance: She is well-developed and well-nourished  She is not toxic-appearing  HENT:      Head: Normocephalic and atraumatic  Nose: Nose normal    Eyes:      General: No visual field deficit  Extraocular Movements: EOM normal       Pupils: Pupils are equal, round, and reactive to light  Neck:      Vascular: No JVD  Cardiovascular:      Rate and Rhythm: Normal rate and regular rhythm  Pulses: Normal pulses  Heart sounds: No murmur heard  Pulmonary:      Effort: No respiratory distress  Breath sounds: Normal breath sounds  No wheezing or rales  Chest:      Chest wall: No tenderness  Abdominal:      General: There is no distension  Tenderness: There is no abdominal tenderness  There is no guarding or rebound  Musculoskeletal:         General: Normal range of motion  Cervical back: Neck supple  Skin:     General: Skin is warm and dry  Capillary Refill: Capillary refill takes less than 2 seconds  Neurological:      Mental Status: She is alert and oriented to person, place, and time  GCS: GCS eye subscore is 4  GCS verbal subscore is 5  GCS motor subscore is 6  Cranial Nerves: No cranial nerve deficit, dysarthria or facial asymmetry  Sensory: No sensory deficit  Motor: No weakness, tremor, abnormal muscle tone or pronator drift  Coordination: Romberg sign negative  Coordination normal  Finger-Nose-Finger Test normal       Gait: Gait is intact  Deep Tendon Reflexes: Reflexes normal    Psychiatric:         Mood and Affect: Mood and affect normal          Behavior: Behavior normal          Additional Data:     Lab Results: I have personally reviewed pertinent reports     and I have personally reviewed pertinent films in PACS    Results from last 7 days   Lab Units 11/28/22  1425   WBC Thousand/uL 6 05   HEMOGLOBIN g/dL 11 5   HEMATOCRIT % 35 4   PLATELETS Thousands/uL 236     Results from last 7 days   Lab Units 11/28/22  1425   SODIUM mmol/L 135   POTASSIUM mmol/L 3 9   CHLORIDE mmol/L 103   CO2 mmol/L 25   BUN mg/dL 10   CREATININE mg/dL 0 83   ANION GAP mmol/L 7 CALCIUM mg/dL 7 7*   GLUCOSE RANDOM mg/dL 96     Results from last 7 days   Lab Units 11/28/22  1425   INR  1 06     Results from last 7 days   Lab Units 11/28/22  1423   POC GLUCOSE mg/dl 96               Imaging: I have personally reviewed pertinent reports  and I have personally reviewed pertinent films in PACS    CTA stroke alert (head/neck)   Final Result by Juan Diego Dietrich MD (11/28 1439)   No significant stenosis of the cervical carotid or vertebral bodies  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  No significant intracranial stenosis, large vessel occlusion or aneurysm  Findings were directly discussed with Maria Elena Diggs at 2:32 PM                            Workstation performed: [id number]         CT stroke alert brain   Final Result by Juan Diego Dietrich MD (11/28 1439)      No acute intracranial abnormality  Findings were directly discussed with Maria Elena Diggs at 2:32 PM       Workstation performed: [id number]             EKG, Pathology, and Other Studies Reviewed on Admission:   · EKG:  NSR    Allscripts / Epic Records Reviewed: Yes     ** Please Note: This note has been constructed using a voice recognition system   **

## 2022-11-28 NOTE — ASSESSMENT & PLAN NOTE
Reports dizziness at the time of headache  No longer symptomatic at this time  Continue meclizine 12 5 mg q 8 p r n    MRI brain showed no acute intracranial abnormality

## 2022-11-28 NOTE — ASSESSMENT & PLAN NOTE
36 year female with GERD and asthma as per who presented to ER following refer to the urgent care due to dizziness, nausea and vomiting  She states to have developed headache with associated nausea  NIH 2  CTH/CTA with no acute findings  Not deemed a TNK or thrombectomy candidate  Concern for complex migraine vs TIA  Given aspirin 325 mg and Plavix 300 mg   MRI with no acute intracranial abnormalities, 2D Echo canceled   Neurology consulted  · Status post aspirin 325 mg x 1 in the ER,  Start aspirin 81 mg from on 11/29  · Status post Plavix 300 mg x 1,  Plavix 75 mg daily starting on 11/29  · Check HbA1c and lipid panel  · Atorvastatin 40 mg daily  · Continue to monitor on telemetry  · Physical therapy/occupational therapy/speech therapy  · Frequent neuro checks    · Avoid hypoglycemia/hyperglycemia/pyrexia

## 2022-11-28 NOTE — ASSESSMENT & PLAN NOTE
Reports dizziness at the time of headache  No longer symptomatic at this time  Follow MRI brain  Continue meclizine 12 5 mg q 8 p r n

## 2022-11-28 NOTE — ASSESSMENT & PLAN NOTE
History of migraine headaches  No photosensitivity/ sound sensitivity    Took migraine cocktail, symptoms have resolved

## 2022-11-28 NOTE — ASSESSMENT & PLAN NOTE
Remote hx of migraines  Headache currently resolved; reports left posterior neck pain  No photo/phono sensitivity; +nausea  Recommend migraine cocktail:  -1L IVF bolus  -benadryl 25mg x1  -reglan 10mg x1  -toradol 15mg x1  -mag sulfate 1gm x1  -prn tylenol  -prn aqua K pad to neck  -prn zofran

## 2022-11-28 NOTE — ASSESSMENT & PLAN NOTE
36 year female with GERD and asthma as per who presented to ER following refer to the urgent care due to dizziness, nausea and vomiting  She states to have developed headache with associated nausea  NIH 2  CTH/CTA with no acute findings  Not deemed a TNK or thrombectomy candidate  Status post aspirin 325 mg and Plavix 300 mg x 1  Neurology on board  · Follow MRI brain  · Obtain 2D echo,  · Permissive hypertension  Consider labetalol if SBP greater than 200  · Status post aspirin 325 mg x 1 in the ER,  Start aspirin 81 mg from on 11/29  · Status post Plavix 300 mg x 1,  Plavix 75 mg daily starting on 11/29  · Check HbA1c and lipid panel  · Atorvastatin 40 mg daily  · Continue to monitor on telemetry  · Physical therapy/occupational therapy/speech therapy  · Frequent neuro checks    · Avoid hypoglycemia/hyperglycemia/pyrexia

## 2022-11-29 ENCOUNTER — APPOINTMENT (OUTPATIENT)
Dept: NON INVASIVE DIAGNOSTICS | Facility: HOSPITAL | Age: 42
End: 2022-11-29

## 2022-11-29 ENCOUNTER — APPOINTMENT (OUTPATIENT)
Dept: MRI IMAGING | Facility: HOSPITAL | Age: 42
End: 2022-11-29

## 2022-11-29 VITALS
OXYGEN SATURATION: 98 % | BODY MASS INDEX: 31.76 KG/M2 | HEART RATE: 66 BPM | SYSTOLIC BLOOD PRESSURE: 111 MMHG | TEMPERATURE: 98.3 F | HEIGHT: 64 IN | WEIGHT: 186 LBS | RESPIRATION RATE: 15 BRPM | DIASTOLIC BLOOD PRESSURE: 68 MMHG

## 2022-11-29 PROBLEM — R29.90 STROKE-LIKE SYMPTOMS: Status: RESOLVED | Noted: 2022-11-28 | Resolved: 2022-11-29

## 2022-11-29 PROBLEM — G43.909 MIGRAINE: Status: ACTIVE | Noted: 2022-11-29

## 2022-11-29 LAB
ANION GAP SERPL CALCULATED.3IONS-SCNC: 9 MMOL/L (ref 4–13)
AORTIC ROOT: 2.8 CM
APICAL FOUR CHAMBER EJECTION FRACTION: 55 %
ASCENDING AORTA: 3 CM
ATRIAL RATE: 67 BPM
BUN SERPL-MCNC: 8 MG/DL (ref 5–25)
CALCIUM SERPL-MCNC: 7.9 MG/DL (ref 8.3–10.1)
CHLORIDE SERPL-SCNC: 109 MMOL/L (ref 96–108)
CHOLEST SERPL-MCNC: 187 MG/DL
CO2 SERPL-SCNC: 24 MMOL/L (ref 21–32)
CREAT SERPL-MCNC: 0.82 MG/DL (ref 0.6–1.3)
E WAVE DECELERATION TIME: 172 MS
ERYTHROCYTE [DISTWIDTH] IN BLOOD BY AUTOMATED COUNT: 13.2 % (ref 11.6–15.1)
EST. AVERAGE GLUCOSE BLD GHB EST-MCNC: 97 MG/DL
FRACTIONAL SHORTENING: 35 % (ref 28–44)
GFR SERPL CREATININE-BSD FRML MDRD: 87 ML/MIN/1.73SQ M
GLUCOSE P FAST SERPL-MCNC: 89 MG/DL (ref 65–99)
GLUCOSE SERPL-MCNC: 89 MG/DL (ref 65–140)
HBA1C MFR BLD: 5 %
HCT VFR BLD AUTO: 37.9 % (ref 34.8–46.1)
HDLC SERPL-MCNC: 72 MG/DL
HGB BLD-MCNC: 12.4 G/DL (ref 11.5–15.4)
INTERVENTRICULAR SEPTUM IN DIASTOLE (PARASTERNAL SHORT AXIS VIEW): 0.8 CM
INTERVENTRICULAR SEPTUM: 0.8 CM (ref 0.6–1.1)
LAAS-AP2: 17.7 CM2
LAAS-AP4: 12.9 CM2
LDLC SERPL CALC-MCNC: 107 MG/DL (ref 0–100)
LEFT ATRIUM SIZE: 3.4 CM
LEFT INTERNAL DIMENSION IN SYSTOLE: 3 CM (ref 2.1–4)
LEFT VENTRICULAR INTERNAL DIMENSION IN DIASTOLE: 4.6 CM (ref 3.5–6)
LEFT VENTRICULAR POSTERIOR WALL IN END DIASTOLE: 0.8 CM
LEFT VENTRICULAR STROKE VOLUME: 62 ML
LVSV (TEICH): 62 ML
MCH RBC QN AUTO: 30.1 PG (ref 26.8–34.3)
MCHC RBC AUTO-ENTMCNC: 32.7 G/DL (ref 31.4–37.4)
MCV RBC AUTO: 92 FL (ref 82–98)
MV E'TISSUE VEL-SEP: 15 CM/S
MV PEAK A VEL: 0.67 M/S
MV PEAK E VEL: 87 CM/S
MV STENOSIS PRESSURE HALF TIME: 50 MS
MV VALVE AREA P 1/2 METHOD: 4.4 CM2
NONHDLC SERPL-MCNC: 115 MG/DL
P AXIS: 77 DEGREES
PLATELET # BLD AUTO: 248 THOUSANDS/UL (ref 149–390)
PMV BLD AUTO: 10.8 FL (ref 8.9–12.7)
POTASSIUM SERPL-SCNC: 4.3 MMOL/L (ref 3.5–5.3)
PR INTERVAL: 156 MS
QRS AXIS: 60 DEGREES
QRSD INTERVAL: 74 MS
QT INTERVAL: 422 MS
QTC INTERVAL: 445 MS
RBC # BLD AUTO: 4.12 MILLION/UL (ref 3.81–5.12)
RIGHT ATRIUM AREA SYSTOLE A4C: 12.4 CM2
RIGHT VENTRICLE ID DIMENSION: 3.4 CM
SL CV LEFT ATRIUM LENGTH A2C: 5 CM
SL CV LV EF: 55
SL CV PED ECHO LEFT VENTRICLE DIASTOLIC VOLUME (MOD BIPLANE) 2D: 97 ML
SL CV PED ECHO LEFT VENTRICLE SYSTOLIC VOLUME (MOD BIPLANE) 2D: 35 ML
SODIUM SERPL-SCNC: 142 MMOL/L (ref 135–147)
T WAVE AXIS: -7 DEGREES
TR MAX PG: 18 MMHG
TR PEAK VELOCITY: 2.1 M/S
TRICUSPID VALVE PEAK REGURGITATION VELOCITY: 2.13 M/S
TRIGL SERPL-MCNC: 38 MG/DL
VENTRICULAR RATE: 67 BPM
WBC # BLD AUTO: 5.03 THOUSAND/UL (ref 4.31–10.16)

## 2022-11-29 RX ORDER — CYCLOBENZAPRINE HCL 10 MG
10 TABLET ORAL 3 TIMES DAILY PRN
Qty: 30 TABLET | Refills: 0 | Status: SHIPPED | OUTPATIENT
Start: 2022-11-29 | End: 2022-12-20

## 2022-11-29 RX ORDER — ASPIRIN 81 MG/1
81 TABLET ORAL DAILY
Qty: 30 TABLET | Refills: 1 | Status: SHIPPED | OUTPATIENT
Start: 2022-11-30 | End: 2023-01-29

## 2022-11-29 RX ORDER — MECLIZINE HCL 12.5 MG/1
12.5 TABLET ORAL EVERY 8 HOURS PRN
Qty: 30 TABLET | Refills: 0 | Status: SHIPPED | OUTPATIENT
Start: 2022-11-29

## 2022-11-29 RX ORDER — CLOPIDOGREL BISULFATE 75 MG/1
75 TABLET ORAL DAILY
Status: DISCONTINUED | OUTPATIENT
Start: 2022-11-29 | End: 2022-11-29

## 2022-11-29 RX ORDER — ASPIRIN 81 MG/1
81 TABLET ORAL DAILY
Status: DISCONTINUED | OUTPATIENT
Start: 2022-11-29 | End: 2022-11-29 | Stop reason: HOSPADM

## 2022-11-29 RX ADMIN — CLOPIDOGREL BISULFATE 75 MG: 75 TABLET ORAL at 09:48

## 2022-11-29 RX ADMIN — ASPIRIN 81 MG: 81 TABLET, COATED ORAL at 09:48

## 2022-11-29 RX ADMIN — ENOXAPARIN SODIUM 40 MG: 40 INJECTION SUBCUTANEOUS at 09:48

## 2022-11-29 NOTE — DISCHARGE SUMMARY
[facility]  Discharge- Julissa Salamanca 1980, 36 y o  female MRN: [id number]  Unit/Bed#: -02 Encounter: [id number]  Primary Care Provider: No primary care provider on file  Date and time admitted to hospital: 11/28/2022  2:13 PM    * Stroke-like symptoms-resolved as of 11/29/2022  Assessment & Plan  36 year female with GERD and asthma as per who presented to ER following refer to the urgent care due to dizziness, nausea and vomiting  She states to have developed headache with associated nausea  NIH 2  CTH/CTA with no acute findings  Not deemed a TNK or thrombectomy candidate  Concern for complex migraine vs TIA  Given aspirin 325 mg and Plavix 300 mg   MRI with no acute intracranial abnormalities, 2D Echo canceled   Neurology consulted  · Status post aspirin 325 mg x 1 in the ER,  Start aspirin 81 mg from on 11/29  · Status post Plavix 300 mg x 1,  Plavix 75 mg daily starting on 11/29  · Check HbA1c and lipid panel  · Atorvastatin 40 mg daily  · Continue to monitor on telemetry  · Physical therapy/occupational therapy/speech therapy  · Frequent neuro checks  · Avoid hypoglycemia/hyperglycemia/pyrexia    Headache  Assessment & Plan  History of migraine headaches  No photosensitivity/ sound sensitivity  Took migraine cocktail, symptoms have resolved        Dizzy  Assessment & Plan  Reports dizziness at the time of headache  No longer symptomatic at this time  Continue meclizine 12 5 mg q 8 p r n  MRI brain showed no acute intracranial abnormality    GERD (gastroesophageal reflux disease)  Assessment & Plan  Endorses history of GERD  Previously on PPI however not on any PPI at this time  Asthma  Assessment & Plan  Stable  In no acute exacerbation at this time  Discharging Resident Physician: Xiang Gong MD  Attending: Mell Patrick MD  PCP: No primary care provider on file    Admission Date: 11/28/2022  Admission Date:   Admission Orders (From admission, onward)     Ordered        11/28/22 1528  Place in Observation  Once                      Discharge Date: 11/29/22    Disposition:     Home    Reason for Admission: stroke like symptoms     Consultations During Hospital Stay:  · Neurology    Procedures Performed:     No orders of the defined types were placed in this encounter  Diagnosis:    Medical Problems     Resolved Problems  Date Reviewed: 11/28/2022          Resolved    * (Principal) Stroke-like symptoms 11/29/2022     Resolved by  Stacia Abrams MD          Active Problems:    Headache    Dizzy    Asthma    GERD (gastroesophageal reflux disease)    Migraine      Significant Findings / Test Results:     MRI brain wo contrast   Final Result by Anne Duke MD (11/29 2400)      No acute intracranial abnormality  Single small nonspecific white matter signal abnormality in left posterior frontal centrum semiovale  Workstation performed: [id number]         CTA stroke alert (head/neck)   Final Result by Peyton Harmon MD (11/28 3649)   No significant stenosis of the cervical carotid or vertebral bodies  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  No significant intracranial stenosis, large vessel occlusion or aneurysm  Findings were directly discussed with Mera Dash at 2:32 PM                            Workstation performed: [id number]         CT stroke alert brain   Final Result by Peyton Harmon MD (11/28 1439)      No acute intracranial abnormality  Findings were directly discussed with Mera Dash at 2:32 PM       Workstation performed: [id number]         ·     Incidental Findings:   · 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     Test Results Pending at Discharge (will require follow up):    · None     Outpatient Tests Requested:  · None     Complications:  none    Hospital Course:     Tavon Rich is a 36 y o  female patient who originally presented to the hospital on 11/28/2022 due to headache, back pain, and chronic left neck pain  Yesterday during her dentist appointment, she reported a sudden severe headache that was associated with nausea  Following her headaches she experience double vision and difficulty walking  She also had a dizziness sensation like the room was spinning  In the emergency department she was worked up for a stroke alert vs complex migraine  CT of the head showed no acute intracranial abnormalities  CT Angiography showed no significant stenosis of the cervical carotid or vertebral bodies  A carotid web was noted in the proximal right ICA  Her lipid panels were abnormal and she was treated with statin  For her stroke workup she was given aspirin and plavix  In the ED she was given a migraine cocktail and meclizine and she reports no headaches or dizziness today  MRI showed no acute intracranial abnormalities  Neurology was consulted and recommended that she follow up with an out patient vasculature surgery for the carotid web ICA findings  We recommended following up with her primary care physician for the abnormal lipid panel  She is discharged with  aspirin for the right internal carotid artery vascular web  At the time of discharge, patient does appear hemodynamically and clinically stable  She offers no complaints at this time  She verbalizes understanding regarding need for vascular surgery and neurology follow-up as outpatient  Questions were answered to satisfaction  Rest of the details as per assessment and plan  Condition at Discharge: stable     Discharge Day Visit / Exam:     Subjective:  Ms Chandan Mckeon was seen in bed this morning before her MRI  She reports no dizziness or headaches  She has no nausea and did not vomit   She complains of left sided neck pain and says this has been there for months    Vitals: Blood Pressure: 111/68 (11/29/22 1000)  Pulse: 66 (11/29/22 1000)  Temperature: 98 3 °F (36 8 °C) (11/29/22 0656)  Temp Source: Oral (11/29/22 0130)  Respirations: 15 (11/29/22 0656)  Height: 5' 4" (162 6 cm) (11/29/22 1000)  Weight - Scale: 84 4 kg (186 lb) (11/29/22 1000)  SpO2: 98 % (11/29/22 0656)  Exam:   Physical Exam  Vitals and nursing note reviewed  Constitutional:       General: She is not in acute distress  Appearance: Normal appearance  She is not toxic-appearing  HENT:      Head: Normocephalic and atraumatic  Nose: Nose normal       Mouth/Throat:      Mouth: Mucous membranes are moist    Eyes:      General: No visual field deficit or scleral icterus  Right eye: No discharge  Left eye: No discharge  Extraocular Movements: Extraocular movements intact  Pupils: Pupils are equal, round, and reactive to light  Neck:      Comments: Left Neck pain  Cardiovascular:      Rate and Rhythm: Normal rate and regular rhythm  Pulses: Normal pulses  Heart sounds: Normal heart sounds  Pulmonary:      Effort: Pulmonary effort is normal       Breath sounds: Normal breath sounds  Abdominal:      General: Abdomen is flat  There is no distension  Tenderness: There is no abdominal tenderness  Musculoskeletal:         General: No swelling or tenderness  Right lower leg: No edema  Left lower leg: No edema  Skin:     General: Skin is warm  Coloration: Skin is not jaundiced  Neurological:      General: No focal deficit present  Mental Status: She is alert  Cranial Nerves: Cranial nerves 2-12 are intact  No cranial nerve deficit, dysarthria or facial asymmetry  Sensory: Sensation is intact  Motor: Motor function is intact  Psychiatric:         Mood and Affect: Mood normal          Discussion with Family:  Talked to patient and family at bedside  All questions/concerns were answered  All agrees with the plan  Discharge instructions/Information to patient and family:   See after visit summary for information provided to patient and family        Provisions for Follow-Up Care:  See after visit summary for information related to follow-up care and any pertinent home health orders  Planned Readmission: none      Discharge Medications:  See after visit summary for reconciled discharge medications provided to patient and family  Discharge Statement :  I spent 25 minutes discharging the patient  This time was spent on the day of discharge  I had direct contact with the patient on the day of discharge  Additional documentation is required if more than 30 minutes were spent on discharge           ** Please Note: This note has been constructed using a voice recognition system **

## 2022-11-29 NOTE — PHYSICAL THERAPY NOTE
Physical Therapy Evaluation     Patient's Name: Jaelyn Allan    Admitting Diagnosis  Neck pain [M54 2]  Back pain [M54 9]  Stroke-like symptoms [R29 90]    Problem List  Patient Active Problem List   Diagnosis    Headache    Dizzy    Asthma    GERD (gastroesophageal reflux disease)     Past Medical History  No past medical history on file  Past Surgical History  No past surgical history on file  11/29/22 1041   PT Last Visit   PT Visit Date 11/29/22   Note Type   Note type Evaluation   Pain Assessment   Pain Assessment Tool 0-10   Pain Score No Pain   Restrictions/Precautions   Weight Bearing Precautions Per Order No   Braces or Orthoses Other (Comment)  (back brace-PRN)   Home Living   Type of Home House   Home Layout Two level;Bed/bath upstairs  (1 [address]; flight of stairs to 2nd floor)   Bathroom Shower/Tub Tub/shower unit   Bathroom Toilet Standard   Bathroom Equipment Other (Comment)  (none per patient)   [address] Other (Comment)  (none per patient)   Additional Comments Pt ambulates without an AD  Prior Function   Level of [place] Independent with functional mobility; Independent with ADLs; Independent with IADLS   Lives With [facility] Help From Family   IADLs Independent with meal prep; Independent with medication management   Falls in the last 6 months 0   Vocational Full time employment   General   Family/Caregiver Present No   Cognition   Overall Cognitive Status WFL   Arousal/Participation Alert   Orientation Level Oriented X4   Memory Within functional limits   Following Commands Follows all commands and directions without difficulty   Comments Pt agreeable to PT     Subjective   Subjective "I'm good "   RLE Assessment   RLE Assessment WFL   LLE Assessment   LLE Assessment WFL   Light Touch   RLE Light Touch Grossly intact   LLE Light Touch Grossly intact   Bed Mobility   Supine to Sit 6  Modified independent   Additional items HOB elevated   Sit to Supine 6  Modified independent   Additional items HOB elevated   Transfers   Sit to Stand 7  Independent   Stand to Sit 7  Independent   Ambulation/Elevation   Gait pattern WNL   Gait Assistance 7  Independent   Assistive Device None   Distance 150 feet   Stair Management Assistance 7  Independent   Stair Management Technique Alternating pattern; Foreward; No rails   Number of Stairs 5   Balance   Static Sitting Normal   Dynamic Sitting Good   Static Standing Good   Dynamic Standing Good   Ambulatory Good   Endurance Deficit   Endurance Deficit No   Activity Tolerance   Activity Tolerance Patient tolerated treatment well   Assessment   Prognosis Good   Problem List   (no acute PT impairments)   Assessment Pt is 36year old female seen for PT evaluation s/p admit to [facility] on 11/28/2022 with Stroke-like symptoms  PT consulted to assess pt's functional mobility and d/c needs  Order placed for PT eval and tx  Comorbidities affecting pt's physical performance at time of assessment include headache, dizzy, asthma, and GERD  PTA, pt was independent with all functional mobility without an AD  Pt ambulates unrestricted distances on all terrain and elevations  Pt resides with her family in a two level house with one step to enter and a flight of stairs to the 2nd floor  Personal factors affecting pt at time of IE include lives in a two story house and a step to enter the home  Please find objective findings from PT assessment regarding body systems outlined above  The following objective measures performed on IE also reveal limitations: Barthel Index: 100/100, Modified [place]: 0 no symptoms at all and AM-PAC 6-Clicks: 18/45  Pt's clinical presentation is currently stable seen in pt's presentation of need for ongoing medical management/monitoring  Pt is independent with transfers and functional mobility  From PT/mobility standpoint, recommendation at time of d/c would be home with no PT needs   Plan to discontinue PT    Barriers to Discharge None   Goals   Patient Goals to go home   Plan   PT Frequency Other (Comment)  (discontinue PT)   Recommendation   PT Discharge Recommendation No rehabilitation needs   AM-PAC Basic Mobility Inpatient   Turning in Bed Without Bedrails 4   Lying on Back to Sitting on Edge of Flat Bed 4   Moving Bed to Chair 4   Standing Up From Chair 4   Walk in Room 4   Climb 3-5 Stairs 4   Basic Mobility Inpatient Raw Score 24   Basic Mobility Standardized Score 57 68   Highest Level Of Mobility   JH-HLM Goal 8: Walk 250 feet or more   JH-HLM Achieved 7: Walk 25 feet or more   Modified [place] Scale   Modified [place] Scale 0   Barthel Index   Feeding 10   Bathing 5   Grooming Score 5   Dressing Score 10   Bladder Score 10   Bowels Score 10   Toilet Use Score 10   Transfers (Bed/Chair) Score 15   Mobility (Level Surface) Score 15   Stairs Score 10   Barthel Index Score 100     PT Evaluation Time: 1855-0306  Particia Adolfo, PT, DPT

## 2022-11-29 NOTE — OCCUPATIONAL THERAPY NOTE
Occupational Therapy Screen        Patient Name: Sade Linares  XHTIQ'N Date: 11/29/2022 11/29/22 0949   OT Last Visit   OT Visit Date 11/29/22   Note Type   Note type Screen   Additional Comments OT orders received  Chart received performed  Based on conversation with nursing and PT, pt appears to be performing at functional baseline  OT performed screen and discussed results with pt  Pt does not demonstrate skilled OT needs at this time  OT provided edu and card with OT contact information should something change and the pt decided further OT assessment might be waranted  However, at this time, pt will not be seen further by OT services  DC OT orders           Zeina Angel MS OTR/L

## 2022-11-29 NOTE — DISCHARGE INSTRUCTIONS
Continue aspirin 81mg daily  Seek immediate medical attention for recurrent stroke like symptoms  Outpatient follow up with neurology and vascular surgery

## 2022-11-29 NOTE — PROGRESS NOTES
Progress Note - Neurology   Toni White 36 y o  female [id number]  Unit/Bed#: /-49    Assessment/Plan:    * Stroke-like symptoms-resolved as of 11/29/2022  Assessment & Plan        Migraine  Assessment & Plan  Toni White is a 36 y o  female with remote history of migraines who presented to the ED 11/28/2022 with stroke like symptoms  Stroke alert initiated  NIH 2  /83  CTH/CTA without acute findings  Deemed not a candidate for TNK or thrombectomy  Loaded with asa 325mg and plavix 300mg x1 and admitted on stroke pathway for further work-up  She has intermittent left neck pain for the past 3 months which has become constant in the past 2 weeks  11/28 she developed a sudden severe headache with associated nausea for which she took 2 tylenol with resolution of her headache  Following her headache, she experienced horizontal diplopia and she felt dizzy and off balance like the room was spinning  She initially went to urgent care and they referred her to the ED for further evaluation  Headache currently resolved s/p migraine cocktail  She reports that she is back to her baseline with the exception of left posterior neck pain  Imaging:  CTH: IMPRESSION:  No acute intracranial abnormality  CTA: IMPRESSION:  No significant stenosis of the cervical carotid or vertebral bodies  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  No significant intracranial stenosis, large vessel occlusion or aneurysm  MRI: IMPRESSION:  No acute intracranial abnormality  Single small nonspecific white matter signal abnormality in left posterior frontal centrum semiovale    Pertinent labs:  Lipid panel: 187/38/72/107  A1c 5  Recommendations:  MRI negative for acute infarct; likely complex migraine   D/C stroke pathway  Continue asa 81mg daily  Outpatient follow up with vascular surgery for surveillance of asymptomatic right ICA carotid web noted on CTA  Outpatient follow up with neurology in 6-8 weeks  Informed patient to seek immediate medical attention for recurrent stroke like symptoms      Amber Harris will need follow up in in 6 weeks with headache attending or APC  She will not require outpatient neurological testing  Subjective:   Feels much better today  Presenting symptoms resolved  Left posterior neck pain continues    No past medical history on file  No past surgical history on file  No family history on file  Social History     Socioeconomic History   • Marital status: Single     Spouse name: Not on file   • Number of children: Not on file   • Years of education: Not on file   • Highest education level: Not on file   Occupational History   • Not on file   Tobacco Use   • Smoking status: Not on file   • Smokeless tobacco: Not on file   Substance and Sexual Activity   • Alcohol use: Not on file   • Drug use: Not on file   • Sexual activity: Not on file   Other Topics Concern   • Not on file   Social History Narrative   • Not on file     Social Determinants of Health     Financial Resource Strain: Not on file   Food Insecurity: No Food Insecurity   • Worried About Running Out of Food in the Last Year: Never true   • Ran Out of Food in the Last Year: Never true   Transportation Needs: No Transportation Needs   • Lack of Transportation (Medical): No   • Lack of Transportation (Non-Medical): No   Physical Activity: Not on file   Stress: Not on file   Social Connections: Not on file   Intimate Partner Violence: Not on file   Housing Stability: Unknown   • Unable to Pay for Housing in the Last Year: No   • Number of Places Lived in the Last Year: Not on file   • Unstable Housing in the Last Year: No     No existing history information found  No existing history information found  Medications:   All current active meds have been reviewed and current meds:  Scheduled Meds:  Current Facility-Administered Medications   Medication Dose Route Frequency Provider Last Rate   • aspirin  81 mg Oral Daily Soren MONGE Amina Fess     • cyclobenzaprine  10 mg Oral TID PRN Xiang Randolph MD     • enoxaparin  40 mg Subcutaneous Daily Xiang Randolph MD     • meclizine  12 5 mg Oral Q8H PRN Brandan Issa MD     • ondansetron  4 mg Intravenous Q8H PRN Brandan Issa MD       Continuous Infusions:   PRN Meds:  •  cyclobenzaprine  •  meclizine  •  ondansetron       ROS:   Review of Systems   Eyes: Negative for visual disturbance  Musculoskeletal: Positive for neck pain  Neurological: Negative for dizziness and headaches  Vitals:   /68   Pulse 66   Temp 98 3 °F (36 8 °C)   Resp 15   Ht 5' 4" (1 626 m)   Wt 84 5 kg (186 lb 3 2 oz)   SpO2 98%   BMI 31 96 kg/m²     Physical Exam:   Physical Exam  Vitals reviewed  Constitutional:       General: She is not in acute distress  HENT:      Head: Normocephalic and atraumatic  Pulmonary:      Effort: Pulmonary effort is normal    Skin:     General: Skin is warm  Neurological:      Mental Status: She is alert and oriented to person, place, and time  Cranial Nerves: Cranial nerves 2-12 are intact  Motor: Motor strength is normal       Coordination: Finger-Nose-Finger Test normal    Psychiatric:         Speech: Speech normal        Neurologic Exam     Mental Status   Oriented to person, place, and time  Attention: normal  Concentration: normal    Speech: speech is normal   Level of consciousness: alert    Cranial Nerves   Cranial nerves II through XII intact  Motor Exam     Strength   Strength 5/5 throughout  Sensory Exam   Light touch normal      Gait, Coordination, and Reflexes     Coordination   Finger to nose coordination: normal    Tremor   Resting tremor: absent    Labs: I have personally reviewed pertinent reports     Recent Results (from the past 24 hour(s))   Fingerstick Glucose (POCT)    Collection Time: 11/28/22  2:23 PM   Result Value Ref Range    POC Glucose 96 65 - 140 mg/dl   Basic metabolic panel    Collection Time: 11/28/22  2:25 PM   Result Value Ref Range    Sodium 135 135 - 147 mmol/L    Potassium 3 9 3 5 - 5 3 mmol/L    Chloride 103 96 - 108 mmol/L    CO2 25 21 - 32 mmol/L    ANION GAP 7 4 - 13 mmol/L    BUN 10 5 - 25 mg/dL    Creatinine 0 83 0 60 - 1 30 mg/dL    Glucose 96 65 - 140 mg/dL    Calcium 7 7 (L) 8 3 - 10 1 mg/dL    eGFR 86 ml/min/1 73sq m   CBC and Platelet    Collection Time: 11/28/22  2:25 PM   Result Value Ref Range    WBC 6 05 4 31 - 10 16 Thousand/uL    RBC 3 87 3 81 - 5 12 Million/uL    Hemoglobin 11 5 11 5 - 15 4 g/dL    Hematocrit 35 4 34 8 - 46 1 %    MCV 92 82 - 98 fL    MCH 29 7 26 8 - 34 3 pg    MCHC 32 5 31 4 - 37 4 g/dL    RDW 13 0 11 6 - 15 1 %    Platelets 932 054 - 848 Thousands/uL    MPV 10 5 8 9 - 12 7 fL   Protime-INR    Collection Time: 11/28/22  2:25 PM   Result Value Ref Range    Protime 13 6 11 6 - 14 5 seconds    INR 1 06 0 84 - 1 19   APTT    Collection Time: 11/28/22  2:25 PM   Result Value Ref Range    PTT 35 23 - 37 seconds   HS Troponin 0hr (reflex protocol)    Collection Time: 11/28/22  2:25 PM   Result Value Ref Range    hs TnI 0hr <2 "Refer to ACS Flowchart"- see link ng/L   FLU/RSV/COVID - if FLU/RSV clinically relevant    Collection Time: 11/28/22  2:25 PM    Specimen: Nose; Nares   Result Value Ref Range    SARS-CoV-2 Negative Negative    INFLUENZA A PCR Negative Negative    INFLUENZA B PCR Negative Negative    RSV PCR Negative Negative   Hemoglobin A1C w/ EAG Estimation    Collection Time: 11/28/22  2:25 PM   Result Value Ref Range    Hemoglobin A1C 5 0 Normal 3 8-5 6%; PreDiabetic 5 7-6 4%;  Diabetic >=6 5%; Glycemic control for adults with diabetes <7 0% %    EAG 97 mg/dl   ECG 12 lead    Collection Time: 11/28/22  2:33 PM   Result Value Ref Range    Ventricular Rate 67 BPM    Atrial Rate 67 BPM    AZ Interval 156 ms    QRSD Interval 74 ms    QT Interval 422 ms    QTC Interval 445 ms    P Axis 77 degrees    QRS Axis 60 degrees    T Wave Axis -7 degrees   Lipid panel Collection Time: 11/29/22  5:01 AM   Result Value Ref Range    Cholesterol 187 See Comment mg/dL    Triglycerides 38 See Comment mg/dL    HDL, Direct 72 >=50 mg/dL    LDL Calculated 107 (H) 0 - 100 mg/dL    Non-HDL-Chol (CHOL-HDL) 115 mg/dl   Basic metabolic panel    Collection Time: 11/29/22  5:01 AM   Result Value Ref Range    Sodium 142 135 - 147 mmol/L    Potassium 4 3 3 5 - 5 3 mmol/L    Chloride 109 (H) 96 - 108 mmol/L    CO2 24 21 - 32 mmol/L    ANION GAP 9 4 - 13 mmol/L    BUN 8 5 - 25 mg/dL    Creatinine 0 82 0 60 - 1 30 mg/dL    Glucose 89 65 - 140 mg/dL    Glucose, Fasting 89 65 - 99 mg/dL    Calcium 7 9 (L) 8 3 - 10 1 mg/dL    eGFR 87 ml/min/1 73sq m   CBC (With Platelets)    Collection Time: 11/29/22  5:01 AM   Result Value Ref Range    WBC 5 03 4 31 - 10 16 Thousand/uL    RBC 4 12 3 81 - 5 12 Million/uL    Hemoglobin 12 4 11 5 - 15 4 g/dL    Hematocrit 37 9 34 8 - 46 1 %    MCV 92 82 - 98 fL    MCH 30 1 26 8 - 34 3 pg    MCHC 32 7 31 4 - 37 4 g/dL    RDW 13 2 11 6 - 15 1 %    Platelets 789 901 - 459 Thousands/uL    MPV 10 8 8 9 - 12 7 fL       Imaging: I have personally reviewed pertinent imaging in PACS and I have personally reviewed PACS reports  EKG, Pathology, and Other Studies: I have personally reviewed pertinent reports  Counseling / Coordination of Care  Total time spent today 30 minutes  Greater than 50% of total time was spent with the patient and/or family counseling and/or coordination of care  Patient was seen and evaluated  Discussed with attending  Chart reviewed thoroughly including laboratory and imaging studies    Plan of care discussed with patient and primary team

## 2022-11-29 NOTE — ASSESSMENT & PLAN NOTE
Bar Avendano is a 36 y o  female with remote history of migraines who presented to the ED 11/28/2022 with stroke like symptoms  Stroke alert initiated  NIH 2  /83  CTH/CTA without acute findings  Deemed not a candidate for TNK or thrombectomy  Loaded with asa 325mg and plavix 300mg x1 and admitted on stroke pathway for further work-up  She has intermittent left neck pain for the past 3 months which has become constant in the past 2 weeks  11/28 she developed a sudden severe headache with associated nausea for which she took 2 tylenol with resolution of her headache  Following her headache, she experienced horizontal diplopia and she felt dizzy and off balance like the room was spinning  She initially went to urgent care and they referred her to the ED for further evaluation  Headache currently resolved s/p migraine cocktail  She reports that she is back to her baseline with the exception of left posterior neck pain  Imaging:  CTH: IMPRESSION:  No acute intracranial abnormality  CTA: IMPRESSION:  No significant stenosis of the cervical carotid or vertebral bodies  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  No significant intracranial stenosis, large vessel occlusion or aneurysm  MRI: IMPRESSION:  No acute intracranial abnormality  Single small nonspecific white matter signal abnormality in left posterior frontal centrum semiovale    Pertinent labs:  Lipid panel: 187/38/72/107  A1c 5  Recommendations:  MRI negative for acute infarct; likely complex migraine   D/C stroke pathway  Continue asa 81mg daily  Outpatient follow up with vascular surgery for surveillance of asymptomatic right ICA carotid web noted on CTA  Outpatient follow up with neurology in 6-8 weeks  Informed patient to seek immediate medical attention for recurrent stroke like symptoms

## 2022-11-29 NOTE — CASE MANAGEMENT
Case Management Assessment & Discharge Planning Note    Patient name Yevgeniy Andersen  Location [facility] [address] [id number] MRN [id number]  : 1980 Date 2022       Current Admission Date: 2022  Current Admission Diagnosis:Stroke-like symptoms   Patient Active Problem List    Diagnosis Date Noted   • Stroke-like symptoms 2022   • Headache 2022   • Dizzy 2022   • Asthma 2022   • GERD (gastroesophageal reflux disease) 2022      LOS (days): 0  Geometric Mean LOS (GMLOS) (days):   Days to GMLOS:     OBJECTIVE:              Current admission status: Observation       Preferred Pharmacy:   [address] [place] [address] [id number] Alex Jones  [address]   [address] [place] [id number]  Phone: [phone] Fax: [phone]    Primary Care Provider: No primary care provider on file  Primary Insurance: [place] MEDICAID  Secondary Insurance:     ASSESSMENT:  Active Health Care Proxies    There are no active Health Care Proxies on file  Readmission Root Cause  30 Day Readmission: No    Patient Information  Admitted from[de-identified] Home  Mental Status: Alert  During Assessment patient was accompanied by: Not accompanied during assessment  Assessment information provided by[de-identified] Patient  Primary Caregiver: Self  Support Systems: Self, Family members  [place] of Residence: Other (specify in comment box)  What city do you live in[place] entry access options  Select all that apply : Stairs  Number of steps to enter home  : 1  Do the steps have railings?: No  Type of Current Residence: 2 story home  Upon entering residence, is there a bedroom on the main floor (no further steps)?: No  A bedroom is located on the following floor levels of residence (select all that apply):: 2nd Floor  Upon entering residence, is there a bathroom on the main floor (no further steps)?: No  Indicate which floors of current residence have a bathroom (select all the apply):: 2nd Floor  Number of steps to 2nd floor from main floor: One Flight  In the last 12 months, was there a time when you were not able to pay the mortgage or rent on time?: No  In the last 12 months, was there a time when you did not have a steady place to sleep or slept in a shelter (including now)?: No  Homeless/housing insecurity resource given?: N/A  Living Arrangements: Lives w/ Extended Family  Is patient a ?: No    Activities of Daily Living Prior to Admission  Functional Status: Independent  Completes ADLs independently?: Yes  Ambulates independently?: Yes  Does patient use assisted devices?: No  Does patient currently own DME?: No  Does patient have a history of Outpatient Therapy (PT/OT)?: No  Does the patient have a history of Short-Term Rehab?: No  Does patient have a history of HHC?: No  Does patient currently have [place] ?: No         Patient Information Continued  Income Source: Employed  Does patient have prescription coverage?: No (Patient pays out of pocket for prescriptions )  Within the past 12 months, you worried that your food would run out before you got the money to buy more : Never true  Within the past 12 months, the food you bought just didn't last and you didn't have money to get more : Never true  Food insecurity resource given?: N/A  Does patient receive dialysis treatments?: No  Does patient have a history of substance abuse?: No  Does patient have a history of Mental Health Diagnosis?: No         Means of Transportation  Means of Transport to Appts[de-identified] Family transport  In the past 12 months, has lack of transportation kept you from medical appointments or from getting medications?: No  In the past 12 months, has lack of transportation kept you from meetings, work, or from getting things needed for daily living?: No  Was application for public transport provided?: N/A        DISCHARGE DETAILS:    Discharge planning discussed with[de-identified] Patient at bedside    Freedom of Choice: Yes  Comments - Freedom of Choice: No referral recommendations made at this time  Were Treatment Team discharge recommendations reviewed with patient/caregiver?: Yes  Did patient/caregiver verbalize understanding of patient care needs?: Yes            Requested [address]         Is the patient interested in [facility] at discharge?: No    DME Referral Provided  Referral made for DME?: No    Other Referral/Resources/Interventions Provided:  Referral Comments: No referral recommendations made at this time

## 2022-12-14 ENCOUNTER — TELEPHONE (OUTPATIENT)
Dept: NEUROLOGY | Facility: CLINIC | Age: 42
End: 2022-12-14

## 2022-12-14 NOTE — TELEPHONE ENCOUNTER
Patient does not have a valid phone number listed in her chart, or e-mail/active mychart  Unable to reach patient by phone or e-mail  Sending letter to patient's address on file to call the office to schedule an appt  HFU/SLMO/STROKE-LIKE SYMPTOMS        NOTES: Irene Keller will need follow up in in 6 weeks with headache attending or APC  She will not require outpatient neurological testing